# Patient Record
Sex: FEMALE | Race: WHITE | NOT HISPANIC OR LATINO | Employment: FULL TIME | URBAN - METROPOLITAN AREA
[De-identification: names, ages, dates, MRNs, and addresses within clinical notes are randomized per-mention and may not be internally consistent; named-entity substitution may affect disease eponyms.]

---

## 2018-02-14 ENCOUNTER — GYNECOLOGY VISIT (OUTPATIENT)
Dept: OBGYN | Facility: CLINIC | Age: 23
End: 2018-02-14
Payer: OTHER GOVERNMENT

## 2018-02-14 ENCOUNTER — HOSPITAL ENCOUNTER (OUTPATIENT)
Facility: MEDICAL CENTER | Age: 23
End: 2018-02-14
Attending: OBSTETRICS & GYNECOLOGY
Payer: OTHER GOVERNMENT

## 2018-02-14 VITALS
HEIGHT: 68 IN | BODY MASS INDEX: 29.1 KG/M2 | WEIGHT: 192 LBS | DIASTOLIC BLOOD PRESSURE: 70 MMHG | SYSTOLIC BLOOD PRESSURE: 115 MMHG

## 2018-02-14 DIAGNOSIS — Z11.3 SCREEN FOR SEXUALLY TRANSMITTED DISEASES: ICD-10-CM

## 2018-02-14 DIAGNOSIS — Z30.017 ENCOUNTER FOR INITIAL PRESCRIPTION OF IMPLANTABLE SUBDERMAL CONTRACEPTIVE: ICD-10-CM

## 2018-02-14 DIAGNOSIS — Z12.4 PAP SMEAR FOR CERVICAL CANCER SCREENING: ICD-10-CM

## 2018-02-14 DIAGNOSIS — Z01.419 WELL WOMAN EXAM WITH ROUTINE GYNECOLOGICAL EXAM: ICD-10-CM

## 2018-02-14 PROCEDURE — 99395 PREV VISIT EST AGE 18-39: CPT | Performed by: OBSTETRICS & GYNECOLOGY

## 2018-02-14 PROCEDURE — 87491 CHLMYD TRACH DNA AMP PROBE: CPT

## 2018-02-14 PROCEDURE — 88175 CYTOPATH C/V AUTO FLUID REDO: CPT

## 2018-02-14 PROCEDURE — 87591 N.GONORRHOEAE DNA AMP PROB: CPT

## 2018-02-14 ASSESSMENT — ENCOUNTER SYMPTOMS
ABDOMINAL PAIN: 0
HEARTBURN: 0
FEVER: 0
MYALGIAS: 0
COUGH: 0
DEPRESSION: 0
NAUSEA: 0
DOUBLE VISION: 0
CHILLS: 0
VOMITING: 0
BLURRED VISION: 0

## 2018-02-15 LAB
C TRACH DNA GENITAL QL NAA+PROBE: NEGATIVE
CYTOLOGY REG CYTOL: NORMAL
N GONORRHOEA DNA GENITAL QL NAA+PROBE: NEGATIVE
SPECIMEN SOURCE: NORMAL

## 2018-07-02 ENCOUNTER — TELEPHONE (OUTPATIENT)
Dept: OBGYN | Facility: CLINIC | Age: 23
End: 2018-07-02

## 2018-07-02 NOTE — TELEPHONE ENCOUNTER
LM for patient to return call. We have tried to reach patient several times, including sending her a letter. Patient needs to contact pharmacy before they will send device. Pharmacy number in referral

## 2018-07-02 NOTE — TELEPHONE ENCOUNTER
----- Message from Karen Cox sent at 7/2/2018 11:32 AM PDT -----  Regarding: questions  Contact: [phone]  Patient called states she is wanting to get update on her iud thank you.

## 2018-08-03 ENCOUNTER — OFFICE VISIT (OUTPATIENT)
Dept: URGENT CARE | Facility: PHYSICIAN GROUP | Age: 23
End: 2018-08-03
Payer: OTHER GOVERNMENT

## 2018-08-03 VITALS
WEIGHT: 186 LBS | BODY MASS INDEX: 28.19 KG/M2 | HEIGHT: 68 IN | RESPIRATION RATE: 16 BRPM | SYSTOLIC BLOOD PRESSURE: 116 MMHG | OXYGEN SATURATION: 97 % | TEMPERATURE: 99.1 F | DIASTOLIC BLOOD PRESSURE: 78 MMHG | HEART RATE: 62 BPM

## 2018-08-03 DIAGNOSIS — H92.01 OTALGIA, RIGHT: ICD-10-CM

## 2018-08-03 DIAGNOSIS — H69.91 ETD (EUSTACHIAN TUBE DYSFUNCTION), RIGHT: ICD-10-CM

## 2018-08-03 PROCEDURE — 99214 OFFICE O/P EST MOD 30 MIN: CPT | Performed by: PHYSICIAN ASSISTANT

## 2018-08-04 NOTE — PROGRESS NOTES
Chief Complaint   Patient presents with   • Otalgia     Right side.  Onset 12 am today.       HISTORY OF PRESENT ILLNESS: Patient is a 23 y.o. female who presents today for about 24 hour history of right ear pain and pressure.  The pain is sharp and is waxing and waning.  No vertigo.  No headaches.  No fevers, chills.    No drainage from the ear.  She denies sensation of hearing loss from the right.   She denies recent travel or elevation changes.   She notes she did have congestion/sinus pressure last week but that has all improved including the sinus pressure.   No attempted interventions.     Patient Active Problem List    Diagnosis Date Noted   • Seasonal allergies 2015   • Atopic dermatitis 2015   • Foot pain, bilateral 2015   • ADD (attention deficit disorder) 2015       Allergies:Patient has no known allergies.    No current [facility]-ordered outpatient prescriptions on file.     No current [facility]-ordered facility-administered medications on file.        Past Medical History:   Diagnosis Date   • ADD (attention deficit disorder)    • Anxiety    • Depression        Social History   Substance Use Topics   • Smoking status: Never Smoker   • Smokeless tobacco: Never Used   • Alcohol use 0.5 oz/week     1 Glasses of wine per week       Family Status   Relation Status   • Mo Alive   • Sis Alive   • MGMo    • MGFa    • PGMo Alive   • PGFa Alive   • Fa Alive   • Neg Hx (Not Specified)     Family History   Problem Relation Age of Onset   • Hypertension Mother    • Psychiatry Sister    • Psychiatry Maternal Grandmother    • Heart Attack Maternal Grandfather    • Cancer Paternal Grandmother 70        breast   • Cancer Paternal Grandfather         skin   • Heart Attack Paternal Grandfather    • Stroke Paternal Grandfather    • Hyperlipidemia Neg Hx    • Diabetes Neg Hx    • Heart Disease Neg Hx        ROS:  Review of Systems   Constitutional: Negative for fever, chills, weight loss and  "malaise/fatigue.   HENT: SEE HPI  Eyes: Negative for blurred vision.   Respiratory: Negative for cough, sputum production, shortness of breath and wheezing.    Cardiovascular: Negative for chest pain, palpitations, orthopnea and leg swelling.   Gastrointestinal: Negative for heartburn, nausea, vomiting and abdominal pain.   Genitourinary: Negative for dysuria, urgency and frequency.     Exam:  Blood pressure 116/78, pulse 62, temperature 37.3 °C (99.1 °F), resp. rate 16, height 1.715 m (5' 7.5\"), weight 84.4 kg (186 lb), last menstrual period 07/25/2018, SpO2 97 %.  General:  Well nourished, well developed female in NAD  Eyes: PERRLA, EOM within normal limits, no conjunctival injection, no scleral icterus, visual fields and acuity grossly intact.  Ears: Normal shape and symmetry, no tenderness, no discharge. External canals are without any significant edema or erythema. Tympanic membranes are without any inflammation, no effusion. Gross auditory acuity is intact.  No mastoid tenderness, no periauricular lymphadenopathy or tenderness  Nose: Symmetrical, sinuses without tenderness, scant clear rhinorrhea.   Mouth: reasonable hygiene, no erythema exudates or tonsillar enlargement.  Neck: no masses, range of motion within normal limits, no tracheal deviation. No lymphadenopathy  Pulmonary: Normal respiratory effort, no wheezes, crackles, or rhonchi.  Cardiovascular: regular rate and rhythm without murmurs, rubs, or gallops.  Skin: No visible rashes or lesion. Warm, pink, dry.   Extremities: no clubbing, cyanosis, or edema.  Neuro: A&O x 3. Speech normal/clear.  Normal gait.         Assessment/Plan:  1. ETD (Eustachian tube dysfunction), right     2. Otalgia, right         -benign ear exam as well as remainder of HENT for other referred sources of otalgia.  Consistent with ETD.   -discussed trial of saline irrigation and/or daily use of Flonase for inflammation as well as 2-3 days of morning decongestant trial.   -signs " and symptoms of bacterial ear infection and RTC precautions discussed.   -NSAID/Tylenol for pain ok.       Supportive care, differential diagnoses, and indications for immediate follow-up discussed with patient.   Pathogenesis of diagnosis discussed including typical length and natural progression.   Instructed to return to clinic or nearest emergency department for any change in condition, further concerns, or worsening of symptoms.  Patient states understanding of the plan of care and discharge instructions.        Sachi Lawton P.A.-C.

## 2018-08-30 ENCOUNTER — TELEPHONE (OUTPATIENT)
Dept: OBGYN | Facility: CLINIC | Age: 23
End: 2018-08-30

## 2018-11-15 ENCOUNTER — GYNECOLOGY VISIT (OUTPATIENT)
Dept: OBGYN | Facility: CLINIC | Age: 23
End: 2018-11-15
Payer: OTHER GOVERNMENT

## 2018-11-15 VITALS — DIASTOLIC BLOOD PRESSURE: 70 MMHG | SYSTOLIC BLOOD PRESSURE: 112 MMHG | WEIGHT: 173 LBS | BODY MASS INDEX: 26.7 KG/M2

## 2018-11-15 DIAGNOSIS — Z30.017 ENCOUNTER FOR INSERTION SUBDERMAL CONTRACEPTIVE: ICD-10-CM

## 2018-11-15 DIAGNOSIS — Z30.46 ENCOUNTER FOR REMOVAL OF SUBDERMAL CONTRACEPTIVE IMPLANT: ICD-10-CM

## 2018-11-15 PROCEDURE — 11983 REMOVE/INSERT DRUG IMPLANT: CPT | Performed by: OBSTETRICS & GYNECOLOGY

## 2018-11-15 NOTE — PROGRESS NOTES
Nexplanon Removal :  Patient given informed consent, and is aware that removal may take longer, require more anesthesia and time.  It also can make a scar slightly, but not substantially larger, than that used for insertion .   Patient is aware that we recommend alternative contraception, until she achieves a normal cycle after removal. Patient is aware that removal, requires identification of the entire implant, either by visual inspection or by xray imagery.     After prepped and draping of left upper inner forearm, implant visually identified and site of insertion marked.   Lidocaine 1% with epi infiltrated both distal and deep to the prior insertion site for anesthesia.   Utilizing a scalpel # 15, a small vertical incision was made near scar of prior insertion site in the site of maximal implant visualization.   Implant identified and capsule was incised with scalpel.  Implant grasped with mosquito forceps and was easily removed, examined and found to be intact/complete.                                    Procedure Note : Nexplanon Insertion :     Today I discussed with the patient subdermal implant, Nexplanon.  We discussed its mechanism of action and how it prevents pregnancy  Discussed that the implant is a progesterone only form of contraception.  Also discussed with patient risks associated with placement of the device which can include infection bruising and pain. We also discussed the possibility of the device can be displaced. We discussed that the device would be palpable under the skin of the arm.  I also discussed with the patient side effects of the device which can include but are not limited to, irregular bleeding which can include amenorrhea, irregular spotting and also worsening of menstrual cycles. There is an increased risk of headaches related to the device and potential for weight gain.  After thorough discussion the patient had opportunity to ask questions  regarding the device and at this time desires the device to be placed today.  Patient information handout has been given for the device.    After informed consent and discussion of risks and benefits, patient was previously prepped and draped to expose the inner medial surface of the upper left arm. Area of prior Nexplanon site was used for new Nexplanon device.  Area totally anesthestized prior for removal of old Nexplanon.   Nexplanon applicator placed with bevel of needle down and skin lifted and inserted to hub. Trigger fired and nexplanon released.  Implant palpated by myself and patient in proper location.   Small needle puncture hemostatic     Hemostasis at removal site achieved with pressure.  Incision closed with Steri Strips. The forearm was then wrapped with a pressure dressing.   Patient tolerated the procedure well.   Remove outer adhesive roll gauze after 24 hrs and steri strips after 3-4 days.   Patient given Postoperative Advice  Take NSAIDS and tylenol for pain, ice packs prn bruising/discomfort  RTC as needed

## 2019-06-25 ENCOUNTER — HOSPITAL ENCOUNTER (OUTPATIENT)
Facility: MEDICAL CENTER | Age: 24
End: 2019-06-25
Attending: ADVANCED PRACTICE MIDWIFE
Payer: COMMERCIAL

## 2019-06-25 ENCOUNTER — HOSPITAL ENCOUNTER (OUTPATIENT)
Facility: MEDICAL CENTER | Age: 24
End: 2019-06-25
Attending: OBSTETRICS & GYNECOLOGY
Payer: COMMERCIAL

## 2019-06-25 ENCOUNTER — GYNECOLOGY VISIT (OUTPATIENT)
Dept: OBGYN | Facility: CLINIC | Age: 24
End: 2019-06-25
Payer: COMMERCIAL

## 2019-06-25 VITALS — SYSTOLIC BLOOD PRESSURE: 110 MMHG | WEIGHT: 167 LBS | BODY MASS INDEX: 25.77 KG/M2 | DIASTOLIC BLOOD PRESSURE: 64 MMHG

## 2019-06-25 DIAGNOSIS — N89.8 VAGINAL DISCHARGE: ICD-10-CM

## 2019-06-25 DIAGNOSIS — N89.8 VAGINAL ITCHING: ICD-10-CM

## 2019-06-25 PROCEDURE — 87660 TRICHOMONAS VAGIN DIR PROBE: CPT

## 2019-06-25 PROCEDURE — 99213 OFFICE O/P EST LOW 20 MIN: CPT | Performed by: OBSTETRICS & GYNECOLOGY

## 2019-06-25 PROCEDURE — 87510 GARDNER VAG DNA DIR PROBE: CPT

## 2019-06-25 PROCEDURE — 87480 CANDIDA DNA DIR PROBE: CPT

## 2019-06-25 NOTE — PROGRESS NOTES
GYN Visit  CC: vaginal itching     Kathie Urban is a 24 y.o.  who presents today for vaginal itching.  She reports that she was treated with multiple courses of antibiotics about a month ago for sinus infection.  Since then she has experienced vaginal itching.  Itching is much worse after intercourse.  Denies noticing any abnormal vaginal discharge.   Has not tried any thing over-the-counter to treat this nor has she noticed any other relieving factors.  Does not use lubrication during intercourse.  Denies any abnormal vaginal bleeding, dysuria, urinary symptoms or bowel symptoms.     OB History:    OB History    Para Term  AB Living   2 0     2     SAB TAB Ectopic Molar Multiple Live Births     2              # Outcome Date GA Lbr Carlitos/2nd Weight Sex Delivery Anes PTL Lv   2 TAB 2015           1 TAB                   Review of Systems:   Pertinent positives documented in HPI and all other systems reviewed & are negative.    All PMH, PSH, allergies, social history and FH reviewed and updated today:  Past Medical History:  Past Medical History:   Diagnosis Date   • ADD (attention deficit disorder)    • Anxiety    • Depression        Past Surgical History:  Past Surgical History:   Procedure Laterality Date   • APPENDECTOMY         Medications:   No current [facility]-ordered outpatient prescriptions on file.     No current [facility]-ordered facility-administered medications on file.        Allergies: Patient has no known allergies.    Social History:  Social History     Social History   • Marital status: Single     Spouse name: N/A   • Number of children: N/A   • Years of education: N/A     Occupational History   • full time student      [employer]     Social History Main Topics   • Smoking status: Never Smoker   • Smokeless tobacco: Never Used   • Alcohol use 0.5 oz/week     1 Glasses of wine per week   • Drug use: No   • Sexual activity: Yes     Partners: Male     Birth control/ protection: Implant      Other Topics Concern   • Not on file     Social History Narrative   • No narrative on file       Family History:  Family History   Problem Relation Age of Onset   • Hypertension Mother    • Psychiatry Sister    • Psychiatry Maternal Grandmother    • Heart Attack Maternal Grandfather    • Cancer Paternal Grandmother 70        breast   • Cancer Paternal Grandfather         skin   • Heart Attack Paternal Grandfather    • Stroke Paternal Grandfather    • Hyperlipidemia Neg Hx    • Diabetes Neg Hx    • Heart Disease Neg Hx            Objective:   Vitals:  /64   Wt 75.8 kg (167 lb)   Body mass index is 25.77 kg/m². (Goal BM I>18 <25)    Physical Exam:   Nursing note and vitals reviewed.  GENERAL: No acute distress  HENT: Atraumatic, normocephalic  EYES: Extraocular movements intact, pupils equal and reactive to light  NECK: Supple, Full ROM  CHEST: No deformities, Equal chest expansion  RESP: Unlabored, no stridor or audible wheeze  ABD: Soft, Nontender, Non-Distended  Extremities: No Clubbing, Cyanosis, or Edema  Skin: Warm/dry, without rases  Neuro: A/O x 4, CN 2-12 Grossly intact, Motor/sensory grossly intact  Psych: Normal mood, behavior, and affect    Genitourinary:  Normal appearing external female genitalia without any rashes, lesions, labial fusion or tenderness.  Vagina is pink moist and well rugated.  White thick discharge present within vaginal vault.  Cervix well visualized without masses or lesions.        Assessment/Plan:   Kathie Urban is a 24 y.o.  female who presents for:    1. Vaginal discharge  Pathology Specimen   2. Vaginal itching  Pathology Specimen     #Vaginal discharge/itching.  Suspect vulvovaginal candidiasis versus bacterial vaginosis.  Vaginal DNA swab obtained today.  Will treat once results obtained.  Will contact patient through my chart with results and send appropriate treatment to pharmacy at that time.  If results are negative discussed with patient that she may  need more lubrication during intercourse.  Discussed diagnoses of yeast infection and bacterial vaginosis with patient and her questions were answered.  #Follow up.  RTC annually or sooner if concerns or questions arise.    Patient was seen for 15 minutes of which > 50% of appointment time was spent on face-to-face counseling and coordination of care regarding the above.

## 2019-06-27 ENCOUNTER — TELEPHONE (OUTPATIENT)
Dept: OBGYN | Facility: CLINIC | Age: 24
End: 2019-06-27

## 2019-06-27 DIAGNOSIS — N76.0 ACUTE VAGINITIS: ICD-10-CM

## 2019-06-27 NOTE — TELEPHONE ENCOUNTER
"Mike from [facility] called stating they need a vaginal pathogens order, they are unable to use \"pathology specimen\". Spoke with midwife Cipriano Armstrong and she will submit new order   "

## 2019-06-28 DIAGNOSIS — N76.0 ACUTE VAGINITIS: ICD-10-CM

## 2019-06-28 LAB
CANDIDA DNA VAG QL PROBE+SIG AMP: NEGATIVE
G VAGINALIS DNA VAG QL PROBE+SIG AMP: POSITIVE
T VAGINALIS DNA VAG QL PROBE+SIG AMP: NEGATIVE

## 2019-06-28 RX ORDER — METRONIDAZOLE 500 MG/1
500 TABLET ORAL 2 TIMES DAILY
Qty: 14 TAB | Refills: 0 | Status: SHIPPED | OUTPATIENT
Start: 2019-06-28 | End: 2019-09-04

## 2019-07-25 ENCOUNTER — HOSPITAL ENCOUNTER (OUTPATIENT)
Dept: LAB | Facility: MEDICAL CENTER | Age: 24
End: 2019-07-25
Attending: ADVANCED PRACTICE MIDWIFE
Payer: COMMERCIAL

## 2019-07-25 ENCOUNTER — GYNECOLOGY VISIT (OUTPATIENT)
Dept: OBGYN | Facility: CLINIC | Age: 24
End: 2019-07-25
Payer: COMMERCIAL

## 2019-07-25 VITALS
SYSTOLIC BLOOD PRESSURE: 106 MMHG | DIASTOLIC BLOOD PRESSURE: 66 MMHG | HEIGHT: 67 IN | WEIGHT: 164 LBS | BODY MASS INDEX: 25.74 KG/M2

## 2019-07-25 DIAGNOSIS — N76.0 ACUTE VAGINITIS: ICD-10-CM

## 2019-07-25 PROCEDURE — 87086 URINE CULTURE/COLONY COUNT: CPT

## 2019-07-25 PROCEDURE — 99213 OFFICE O/P EST LOW 20 MIN: CPT | Performed by: ADVANCED PRACTICE MIDWIFE

## 2019-07-25 RX ORDER — FLUCONAZOLE 150 MG/1
TABLET ORAL
Qty: 2 TAB | Refills: 1 | Status: SHIPPED | OUTPATIENT
Start: 2019-07-25 | End: 2019-09-04

## 2019-07-25 NOTE — NON-PROVIDER
Patient here states she has some vaginal discomfort.  States she was taking Flagyl and on the second to last day her symptoms worsen.  Phone Number [phone]  Pharmacy Confirmation

## 2019-07-25 NOTE — PROGRESS NOTES
"Kathie Urban is a 24 y.o. female who presents for continue vaginal irritation.        HPI Comments: Pt presents for vaginal irritations.  Patient's last menstrual period was 07/22/2019. Patient reports that after her last visit, she did use the flagyl as directed. She reports that her symptoms did resolve but on day 6 of the medication, she noticed change recurrence of symnptoms. At that time, she decided to start boric acid suppositories. She completed these with relief. She had intercourse two days afterwards and began to notice symptoms again. However, she reports starting her period the next day. Today she is without significant irritation or swelling. She has one male sexual partner and has not changes partners. She does report that after intercourse she feels \"raw\". She reports that she does feel well lubricated during intercourse. She does not use lubricants or condoms. Last gonorrhea and chlamydia testing negative in 2017. Patient did use probiotics but discontinued approximately one month ago. No change in soap or detergent. She uses cetaphil as her soap and does not wash aggressively in her vaginal area.     Review of Systems   Pertinent positives documented in HPI and all other systems reviewed & are negative      Past Medical History:   Diagnosis Date   • ADD (attention deficit disorder)    • Anxiety    • Depression        No medications at current     Objective:   Vital measurements:  /66   Ht 1.702 m (5' 7\")   Wt 74.4 kg (164 lb)   Body mass index is 25.69 kg/m². (Goal BM I>18 <25)    Physical Exam   Nursing note and vitals reviewed.  Constitutional: She is oriented to person, place, and time. She appears well-developed and well-nourished. No distress.     Abdominal: Soft. Bowel sounds are normal. She exhibits no distension and no mass. No tenderness. She has no rebound and no guarding.     Genitourinary:  Pelvic exam was performed with patient supine.  External genitalia with no abnormal " pigmentation, labial fusion,rash, tenderness, lesion or injury to the labia bilaterally.  Vagina is moist with no lesions, foul discharge, erythema, or tenderness. Bleeding is present consistent with menses. No foreign body around the vagina or signs of injury.   Cervix exhibits no motion tenderness. Dark blood was cleared and cervix is without friability.    Musculoskeletal: Normal range of motion. She exhibits no edema and no tenderness.     Lymphadenopathy: She has no cervical adenopathy.     Skin: Skin is warm and dry. No rash noted. She is not diaphoretic. No erythema. No pallor.     Psychiatric: She has a normal mood and affect. Her behavior is normal. Judgment and thought content normal.               Assessment:     1. Acute vaginitis  URINE CULTURE    CHLAMYDIA/GC PCR URINE OR SWAB       Plan:   1. Patient and I discussed that at current, there are no abnormal findings. However, due to patient continued complaints, will rule out UTI and repeat GC/Chlamydia testing by urine. At this time, I am concerned about dryness to her vulva as she does have history of sensitive skin. I have requested that after her shower, she place a small barrier of vaseline to this area with clean hands. Also, will see if diflucan x 1 will resolve symptoms although presentation is not consistent with yeast. Unable today to collect vaginal pathogens related to bleeding.  Will call with results.

## 2019-07-26 ENCOUNTER — PATIENT MESSAGE (OUTPATIENT)
Dept: OBGYN | Facility: CLINIC | Age: 24
End: 2019-07-26

## 2019-07-27 LAB
BACTERIA UR CULT: NORMAL
SIGNIFICANT IND 70042: NORMAL
SITE SITE: NORMAL
SOURCE SOURCE: NORMAL

## 2019-09-04 ENCOUNTER — GYNECOLOGY VISIT (OUTPATIENT)
Dept: OBGYN | Facility: CLINIC | Age: 24
End: 2019-09-04
Payer: COMMERCIAL

## 2019-09-04 VITALS — DIASTOLIC BLOOD PRESSURE: 80 MMHG | BODY MASS INDEX: 26 KG/M2 | WEIGHT: 166 LBS | SYSTOLIC BLOOD PRESSURE: 124 MMHG

## 2019-09-04 DIAGNOSIS — Z01.419 WELL WOMAN EXAM: ICD-10-CM

## 2019-09-04 PROCEDURE — 99395 PREV VISIT EST AGE 18-39: CPT | Performed by: OBSTETRICS & GYNECOLOGY

## 2019-09-04 NOTE — NON-PROVIDER
Pt here for Annual exam  Pt states has questions regarding her Nexplanon  Good#[phone]  Pharmacy verified

## 2019-09-04 NOTE — PROGRESS NOTES
Well woman visit     Kathie Urban is a 24 y.o.  who presents for annual exam.  Has been having intermittent bleeding with Nexplanon in place (new from 2018).  This doesn't bother her very much at this point.  Denies any other complaints today.  No abnormal vaginal discharge, dyspareunia, breast concerns, urinary or bowel complaints.  No concerns about STDs as she is in a monogamous relationship.    GYN History:  Patient's last menstrual period was 2019 (exact date).. Menarche @10.  Has Nexplanon - menses irreg.  Last pap 2018,  no h/o abnormal pap.  No history of cone biopsy, LEEP or any other cervical, uterine or gynecologic surgery. No history of sexually transmitted diseases.  Currently sexually active with one male partner.  Utilizing Nexplanon for contraception and has used Nexplanon, OCPs, and condoms in the past.     OB History:    OB History    Para Term  AB Living   2 0     2     SAB TAB Ectopic Molar Multiple Live Births     2              # Outcome Date GA Lbr Carlitos/2nd Weight Sex Delivery Anes PTL Lv   2 TAB 2015           1 TAB                Health Maintenance:  Immunizations: up to date    Review of Systems:   Pertinent positives documented in HPI and all other systems reviewed & are negative.    All PMH, PSH, allergies, social history and FH reviewed and updated today:  Past Medical History:  Past Medical History:   Diagnosis Date   • ADD (attention deficit disorder)    • Anxiety    • Depression      Past Surgical History:  Past Surgical History:   Procedure Laterality Date   • APPENDECTOMY       Medications:   No current [facility]-ordered outpatient medications on file.     No current [facility]-ordered facility-administered medications on file.      Allergies: Patient has no known allergies.    Social History:  Social History     Socioeconomic History   • Marital status: Single     Spouse name: Not on file   • Number of children: Not on file   • Years of education:  Not on file   • Highest education level: Not on file   Occupational History   • Occupation: full time student     Comment: [employer]   Social Needs   • Financial resource strain: Not on file   • Food insecurity:     Worry: Not on file     Inability: Not on file   • Transportation needs:     Medical: Not on file     Non-medical: Not on file   Tobacco Use   • Smoking status: Never Smoker   • Smokeless tobacco: Never Used   Substance and Sexual Activity   • Alcohol use: Yes     Alcohol/week: 0.5 oz     Types: 1 Glasses of wine per week   • Drug use: No   • Sexual activity: Yes     Partners: Male     Birth control/protection: Implant   Lifestyle   • Physical activity:     Days per week: Not on file     Minutes per session: Not on file   • Stress: Not on file   Relationships   • Social connections:     Talks on phone: Not on file     Gets together: Not on file     Attends Congregation service: Not on file     Active member of club or organization: Not on file     Attends meetings of clubs or organizations: Not on file     Relationship status: Not on file   • Intimate partner violence:     Fear of current or ex partner: Not on file     Emotionally abused: Not on file     Physically abused: Not on file     Forced sexual activity: Not on file   Other Topics Concern   • Not on file   Social History Narrative   • Not on file       Family History:  Family History   Problem Relation Age of Onset   • Hypertension Mother    • Psychiatric Illness Sister    • Psychiatric Illness Maternal Grandmother    • Heart Attack Maternal Grandfather    • Cancer Paternal Grandmother 70        breast   • Cancer Paternal Grandfather         skin   • Heart Attack Paternal Grandfather    • Stroke Paternal Grandfather    • Hyperlipidemia Neg Hx    • Diabetes Neg Hx    • Heart Disease Neg Hx            Objective:   Vitals:  /80   Wt 75.3 kg (166 lb)   Body mass index is 26 kg/m². (Goal BM I>18 <25)    Physical Exam:   Nursing note and vitals  reviewed.  Physical Exam   Constitutional: She is oriented to person, place, and time and well-developed, well-nourished, and in no distress.   HENT:   Head: Normocephalic and atraumatic.   Eyes: Pupils are equal, round, and reactive to light.   Neck: Normal range of motion. Neck supple. No thyromegaly present.   Cardiovascular: Normal rate.   Pulmonary/Chest: Effort normal. Right breast exhibits no inverted nipple, no mass, no nipple discharge, no skin change and no tenderness. Left breast exhibits no inverted nipple, no mass, no nipple discharge, no skin change and no tenderness.   Abdominal: Soft. Bowel sounds are normal.   Musculoskeletal: Normal range of motion.   Neurological: She is alert and oriented to person, place, and time.   Skin: Skin is warm and dry.   Psychiatric: Mood, memory, affect and judgment normal.     Genitourinary: deferred     Assessment/Plan:     No diagnosis found.    Kathie Urban is a 24 y.o.  female who presents for  her annual gynecologic exam.   # Preventative health care:   --Breast self awareness discussed. Mammogram not yet indicated (annually starting at age 40).  --Cervical cancer screening.  Up-to-date on Paps  --Immunizations are  up to date.  --Diet, exercise, vitamin supplementation, and hydration discussed.  # Contraception: Utilizing Nexplanon.  We discussed mechanism of action of Nexplanon and options for controlling spotting.  Patient reports understanding after discussion and does not want any further intervention for the spotting at this point as it is not extremely bothersome to her right now.  # STD screening: Declines-monogamous relationship    # Follow up annually or sooner if concerns or questions arise.

## 2020-01-01 NOTE — PROGRESS NOTES
"Subjective:      Kathie Urban is a 22 y.o. female who presents with Annual Exam (annual exam)            22-year-old  here today for annual gynecologic exam and discussion of birth control options. Patient is using a subdermal implant at this time she states she thinks it may have caused her to gain weight. But otherwise she is generally satisfied with the device. She does state she has some abnormal bleeding which is consistent with device use. Patient is otherwise without complaints no pelvic pain or pressure no abnormal vaginal discharge.        Review of Systems   Constitutional: Negative for chills and fever.   HENT: Negative for hearing loss.    Eyes: Negative for blurred vision and double vision.   Respiratory: Negative for cough.    Cardiovascular: Negative for chest pain.   Gastrointestinal: Negative for abdominal pain, heartburn, nausea and vomiting.   Genitourinary: Negative for dysuria, frequency and urgency.   Musculoskeletal: Negative for myalgias.   Skin: Negative for rash.   Psychiatric/Behavioral: Negative for depression.          Objective:     /70   Ht 1.715 m (5' 7.5\")   Wt 87.1 kg (192 lb)   LMP 2018   Breastfeeding? No   BMI 29.63 kg/m²      Physical Exam   Constitutional: She is oriented to person, place, and time. She appears well-developed and well-nourished.   HENT:   Head: Normocephalic and atraumatic.   Neck: Normal range of motion. Neck supple.   Cardiovascular: Normal rate.    Pulmonary/Chest: Effort normal and breath sounds normal. Right breast exhibits no inverted nipple, no mass, no nipple discharge, no skin change and no tenderness. Left breast exhibits no inverted nipple, no mass, no nipple discharge, no skin change and no tenderness. Breasts are symmetrical. There is no breast swelling.   Abdominal: Soft. Bowel sounds are normal. She exhibits no distension.   Genitourinary: Rectal exam shows no external hemorrhoid. No breast tenderness, discharge or " Baby is a 39.2 wk GA M born to a 30 y/o  mother via repeat C/S. Maternal history uncomplicated. Prenatal history uncomplicated. Maternal B+. PNL neg, NR, and immune. GBS unknown. No rupture, no labor. Baby born vigorous and crying spontaneously. WDSS. Apgars 8/9. Mom plans to breastfeed. Yes to hepB. Yes to circ. COVID status neg. Baby found to have a sacral dimple. bleeding. Pelvic exam was performed with patient supine. No labial fusion. There is no rash, tenderness, lesion or injury on the right labia. There is no rash, tenderness, lesion or injury on the left labia. Uterus is not deviated, not enlarged, not fixed and not tender. Cervix exhibits no motion tenderness, no discharge and no friability. Right adnexum displays no mass, no tenderness and no fullness. Left adnexum displays no mass, no tenderness and no fullness. No erythema, tenderness or bleeding in the vagina. No foreign body in the vagina. No signs of injury around the vagina. No vaginal discharge found.   Musculoskeletal: Normal range of motion.   Lymphadenopathy:        Right: No inguinal adenopathy present.        Left: No inguinal adenopathy present.   Neurological: She is alert and oriented to person, place, and time.   Skin: Skin is warm and dry.   Psychiatric: She has a normal mood and affect.   Nursing note and vitals reviewed.              Assessment/Plan:     1. Well woman exam with routine gynecological exam    - THINPREP RFLX HPV ASCUS W/CTNG; Future    2. Screen for sexually transmitted diseases    - THINPREP RFLX HPV ASCUS W/CTNG; Future    3. Pap smear for cervical cancer screening    - THINPREP RFLX HPV ASCUS W/CTNG; Future    4. Encounter for initial prescription of implantable subdermal contraceptive    - REFERRAL TO GYNECOLOGY

## 2020-06-30 ENCOUNTER — OFFICE VISIT (OUTPATIENT)
Dept: MEDICAL GROUP | Facility: PHYSICIAN GROUP | Age: 25
End: 2020-06-30
Payer: COMMERCIAL

## 2020-06-30 ENCOUNTER — TELEPHONE (OUTPATIENT)
Dept: SCHEDULING | Facility: IMAGING CENTER | Age: 25
End: 2020-06-30

## 2020-06-30 VITALS
HEART RATE: 72 BPM | BODY MASS INDEX: 26.06 KG/M2 | TEMPERATURE: 98.2 F | WEIGHT: 166 LBS | HEIGHT: 67 IN | SYSTOLIC BLOOD PRESSURE: 104 MMHG | DIASTOLIC BLOOD PRESSURE: 64 MMHG | OXYGEN SATURATION: 93 %

## 2020-06-30 DIAGNOSIS — J30.2 SEASONAL ALLERGIES: ICD-10-CM

## 2020-06-30 DIAGNOSIS — Z13.6 SCREENING FOR CARDIOVASCULAR CONDITION: ICD-10-CM

## 2020-06-30 DIAGNOSIS — Z13.21 ENCOUNTER FOR VITAMIN DEFICIENCY SCREENING: ICD-10-CM

## 2020-06-30 DIAGNOSIS — Z00.00 ROUTINE ADULT HEALTH MAINTENANCE: ICD-10-CM

## 2020-06-30 DIAGNOSIS — Z13.228 SCREENING FOR METABOLIC DISORDER: ICD-10-CM

## 2020-06-30 DIAGNOSIS — J34.2 DEVIATED SEPTUM: ICD-10-CM

## 2020-06-30 DIAGNOSIS — Z97.5 NEXPLANON IN PLACE: ICD-10-CM

## 2020-06-30 PROCEDURE — 99214 OFFICE O/P EST MOD 30 MIN: CPT | Performed by: NURSE PRACTITIONER

## 2020-06-30 ASSESSMENT — PATIENT HEALTH QUESTIONNAIRE - PHQ9: CLINICAL INTERPRETATION OF PHQ2 SCORE: 0

## 2020-07-01 PROBLEM — Z97.5 NEXPLANON IN PLACE: Status: ACTIVE | Noted: 2020-07-01

## 2020-07-01 PROBLEM — J34.2 DEVIATED SEPTUM: Status: ACTIVE | Noted: 2020-07-01

## 2020-07-01 NOTE — PROGRESS NOTES
"Chief Complaint   Patient presents with   • Establish Care   • Referral Needed     ent         Subjective:     Kathie Urban is a 25 y.o. female presenting with a deviated septum, requesting referral for potential surgery.    Deviated septum: Chronic unstable issue.  Patient reports she has been told by numerous providers that she has a deviated septum, most recently an allergy specialist.  Reports they attributed this to her persistent allergies and uncontrolled allergic rhinitis.  Denies snoring, but does report she is a mouth breather more often than not.  Has not had any injury or trauma to the face.  Had research on her insurance and requesting referral for .  Has not been evaluated by ENT.    Allergies: Chronic stable issue.  Patient has seasonal allergies.  Currently not on any medication.  Has previously taken OTC nondrowsy daily antihistamines with mild/moderate control.  Is no longer seeing allergist.    Due for routine labs; up-to-date on vaccinations.  Established with [facility].  Has the Nexplanon implant, placed in November 2018.  Has tolerated this well, denies notable defect to hormones.  Due for removal or exchange in November 2021.      Review of systems:      Denies chest pain, shortness of breath, sore throat, difficulty swallowing, new cough, dizziness, severe headache, altered cognition, changes in bowel or bladder habits, decreased sensation, decreased strength, numbness or tingling, intolerable depression or anxiety, rash or skin concerns, changes in vision, fatigue, myalgias, painful or swollen lymph nodes.     No current outpatient medications on file.    Allergies, past medical history, past surgical history, family history, social history reviewed and updated    Objective:     Vitals: /64 (BP Location: Right arm, Patient Position: Sitting, BP Cuff Size: Adult)   Pulse 72   Temp 36.8 °C (98.2 °F) (Temporal)   Ht 1.702 m (5' 7\")   Wt 75.3 kg (166 lb)   " SpO2 93%   BMI 26.00 kg/m²   General: Alert, cooperative, dressed appropriately for weather / situation  Eyes:Normocephalic.  EOMI, no icterus or pallor.  Conjunctivae clear without erythema / irritation.  ENT:  External ears developed; Bilat TMs visualized; appear pearly without bulging, effusion, or erythema; crisp light reflex, gross visualization of septum shows deviation to left.  Lymph: Neck supple, absent of cervical or supraclavicular lymphadenopathy.  Thyroid palpated, free of masses or goiter.   Heart: Regular rate and rhythm.  S1 and S2 normal.  No murmurs auscultated; no murmurs / bruits heard over bilateral carotids.  Bilateral radial pulses strong and equal.  Bilateral posterior tibial pulses strong and equal.  Respiratory: Normal respiratory effort.  Clear to auscultation bilaterally.  AP ratio 1:2   Abdomen: Non-distended;  Skin: Visible skin intact, dry, without rash.  Musculoskeletal: Gait is normal.  Bilateral  strength strong equal.  Moves extremities freely and equally bilaterally  Neuro:  AAOx3 Visual tracking intact, no nystagmus;   Psych:  Affect/mood is normal, judgement is good, memory is intact, grooming is appropriate.    Assessment/Plan:     Kathie was seen today for establish care and referral needed.    Diagnoses and all orders for this visit:    Deviated septum: Referral placed per patient request.  We did discuss that some insurance companies may require evaluation by ear nose and throat first.  She is going to attend an appointment with  and let me know if she needs an additional referral.  -     REFERRAL TO PLASTIC SURGERY    Seasonal allergies: Currently controlled without medication.  Discussed available OTC antihistamines as well as intranasal antihistamines if needed.  -     CBC WITH DIFFERENTIAL; Future    Routine adult health maintenance  -     CBC WITH DIFFERENTIAL; Future  -     Comp Metabolic Panel; Future  -     Lipid Profile; Future  -     VITAMIN D,25  HYDROXY; Future    Screening for cardiovascular condition  -     CBC WITH DIFFERENTIAL; Future  -     Lipid Profile; Future    Encounter for vitamin deficiency screening  -     CBC WITH DIFFERENTIAL; Future  -     VITAMIN D,25 HYDROXY; Future    Screening for metabolic disorder  -     Comp Metabolic Panel; Future    Overall patient is in good health, routine labs to be done.  I will follow-up with her regarding the results on [facility].  She can follow-up yearly, sooner if needed      Return in about 1 year (around 6/30/2021).    Patient verbalized understanding and agreed to plan of care.  Encouraged to contact me with needs via [employer] or by phone if needed.      I have placed the above orders and discussed them with an approved delegating provider.  The MA is performing the below orders under the direction of Dr Ugalde.    Please note that this dictation was created using voice recognition software. I have made every reasonable attempt to correct obvious errors, but I expect that there are errors of grammar and possibly content that I did not discover before finalizing the note.

## 2021-08-18 ENCOUNTER — OFFICE VISIT (OUTPATIENT)
Dept: OBGYN | Facility: CLINIC | Age: 26
End: 2021-08-18
Payer: COMMERCIAL

## 2021-08-18 DIAGNOSIS — O92.79 LACTATION DISORDER, POSTPARTUM CONDITION: ICD-10-CM

## 2021-08-18 DIAGNOSIS — N64.3 HYPERLACTATION: ICD-10-CM

## 2021-08-18 DIAGNOSIS — Z91.89 AT RISK FOR POSTPARTUM DEPRESSION: ICD-10-CM

## 2021-08-18 PROBLEM — Z97.5 NEXPLANON IN PLACE: Status: RESOLVED | Noted: 2020-07-01 | Resolved: 2021-08-18

## 2021-08-18 PROCEDURE — 99215 OFFICE O/P EST HI 40 MIN: CPT | Performed by: NURSE PRACTITIONER

## 2021-08-18 PROCEDURE — 99417 PROLNG OP E/M EACH 15 MIN: CPT | Performed by: NURSE PRACTITIONER

## 2021-08-18 NOTE — PROGRESS NOTES
Summary: Difficulty with baby choking on breast and bottle, slowed growth, making 42-48oz per day of breastmilk. Since Monday has  3x/day for 15 min each breast then bottle which can take 45minutes. When only bottle and waiting until 3 hours feedings are 30 minutes. Pumps 8x/day, offers food every 2-2.5 hours, 8-10x/day. Pooping with each feeding. Today Baby weight not increasing from Monday. Offered bare breast with no sustaining, nipple shield used and baby removed 0.9oz on right being taken off every 11-15 sucks to prevent choking which does result in transient perioral cyanosis, quick recovery. With sitting up more and frequent removal less choking and removed 0.8oz.  Total after 25 minutes is 1.6oz. Ineffecient and not effective with potential adversity likely to be created. Pumped 180ml. Fed bottle paced feeding well, baby took about 20ml, then asleep, but quite the struggle. Plan Stop breastfeeding for the next 72 hours, bottle only about every 3 hours for 90ml, pacing and snuggled.Reintorduce breast one sided only, 10 minutes sitting up for baby to manage flow.  Referred to OT for bottle assistance. Pump off the first 40ml in each bottle and replace bottle to give baby higher calorie milk. Decrease pumping time to 8-12 minutes to start reducing milk supply and then plan to decrease number of pumps per 24 hours. Follow up Ped 21 and Lactation next week     Subjective:     Kathie Urban is a 26 y.o. female here for lactation care. She is here today with baby and father of baby    Concerns:   Latch on difficulties  and oversupply  Choking on bottle and breast    HPI:   Pertinent  history: c/section  Mother does not have a history of advanced maternal age, GDM, hypertension prior to pregnancy, insulin resistance, multiple gestation, PCOS and thyroid disease. Common condition(s) which may interfere with milk supply.  Other risk factors history   of anxiety and depression    Breast  changes in pregnancy: Yes  History of breast surgeries: No    Leaking in pg    FEEDING HISTORY:    Past breastfeeding history:  First baby   Hospital course: Pumping initiated and small flanges caused damage to nipples  Currently: Difficulty with baby choking on breast and bottle, slowed growth, making 42-48oz per day of breastmilk. Since Monday has  3x/day for 15 min each breast then bottle which can take 45minutes. When only bottle and waiting until 3 hours feedings are 30 minutes. Pumps 8x/day, offers food every 2-2.5 hours, 8-10x/day.    Both breasts: Yes  Bottle feeds: 8-10x/24    Supplement: Expressed breast milk  Quantity: Offers 90, takes 70 most of the time, BF 3x/day  How given/devices:  Bottle    Nipple Shield Use: 24 mm  Recommended by: self    Breast Pumping:   Frequency: 8-10x/day  Type of pump: Platinum  Flange size/type: 28.5mm  NO pain with pumping    Maternal ROS:  Constitutional: No fever, chills. Feeling well  Extremities Swelling: No extremity swelling  Breasts: No soreness of breasts and No soreness of nipples  Psychiatric: Managing ok  Mental Health: No mention of feeling irritable, agitated, angry, overwhelmed, apathy, exhaustion nor having sleep changes outside infant feeds/demands or appetite changes       Objective:     Maternal Physical   General: No acute distress  Breasts: Symetrical , Soft, Plugged Duct - no evidence and Mastitis  - no S/S  Nipples: intact  Psychiatric:  Normal mood and affect. Her behavior is normal. Judgment and thought content normal   Mental Health:  Did NOT exhibit sadness, crying, feeling overwhelmed, agitation or hypervigilance.     Assessment/Plan & Lactation Counseling:     Infant exam on infant chart    Infant Weight History:   8/3/21   9#4oz  8/16/21  8#6.0oz Ped office  8/18/2021 8#5.9oz    Infant intake at Breast:: 0.9oz right     Left 0.8oz    Total: 1.7oz  Milk Transfer at this feeding:   Ineffective breastfeeding; not able to transfer a full  feed from breast r/t incoordination of SSB  Pumped: Type of Pump: Platinum    Quantity Pumped:     Total: 180ml  Initiation of Feeding: Infant initiates  Position of Feeding:    Right: football  Left: football  Attachment Achieved: with difficulty  Nipple shield:     Size: 24mm       Introduced by mom  Latch achieved yes  Suck Pattern at the breast: Chewing and Gulping, choking, lips turn cyanotic  Suck Pattern on the bottle: Gulping, Losing milk at the side and Disorganized  Behavior Following Observed Feeding: sleeping, exhausted  Nipple Pain: None     Latch: Mom latches independently, Latch difficulty without nipple shield and Latch difficulty: oral/motor issues  Suckling/Feeding: attaches, audible swallows, baby roots, clicks and elicits DAPHNE, pops off to catch her breath, always sucking as if to keep up, cannot pace herself  Milk Supply Available: oversupply     Maternal Diagnosis/Problem:  At risk for PPD  Lactation disorder, baby not transferring sufficient supply  Hyperlactation       BREASTFEEDING PLAN  Discussed concerns and symptoms as listed above in assessment and guidance summarized below.  Topics reviewed included:    •  The nature of infants oral head/neck structure and function and its impact on latch and transfer of milk.   Discussed tongue immobility creating some of the problem, although frenulum is present there is good lift and extension. Would not refer to ENT at this time     •  Triple feeding and its sustainability and its impact on the mother baby relationship  •  Maternal Mental Health: Having a new baby can be joyful time for some new moms, but a difficult time for others. For all, it is a time of profound physical and emotional change. Balancing baby, family, partners and friends, work, pets, and preexisting or new life stressors as well as sleep deprivation can be extremely challenging. Depression and anxiety may be even more prevalent in those moms who experience challenging  breastfeeding complications or struggle with baby weight gain or other health concerns. Untreated depression and anxiety can contribute to early cessation of breastfeeding, so it is important both for the mental health and physical health of new moms and babies to get on the path to feeling better.  Parents need support, not necessarily education. Our society valorizes breastfeeding so we provide the scaffolding that actually supports it.   •  Sleep or lack of and discussed strategies to manage restorative sleep, although short amounts, significant to the mental health of the mother.   • Https://www.Motivapps.com/watch?v=f6dEnrdRkCp&feature=y primeu.be&ab_channel=Infancy%26[facility]%2[facility]  •  Self Care. Encouraged  support, family and father supportive, get rest, getting out of the house each day, walk to the mailbox or drive somewhere,  a treat at a drive thru.  • Hyperlactation (Oversupply) This is a high rate of milk production often causing breast discomfort and or compelling moms to express beyond what the baby is taking.There is no defined clinical criteria. Infant can present with stopping to suckle/letting go, milk spraying in the baby's face, baby coughing or choking or breast refusal, struggle with initial letdown with gasping, fussiness, rapid weight gain (1# a week), excessive gas and refusing the second breast or breast with larger supply.   o Possible causes:   - Mother induced with pumping, frequent HaaKaa use  - Gilmer phenomenon breasts don't respond to feedback of fullness  - Large storage capacity  o Management  - Decrease pumping frequency and duration  - Follow up weight mandatory while deliberately decreasing supply     • Foremilk Hind milk imbalance this is only problematic with high supply. With high supply high lactose load leading to heavy gut osmotic load causing frequent green, mucous stools, stooling during feeding and bothersome gas.     • Milk supply is dependent on glandular  tissue development, hormonal influences, how many times the baby removes milk and how well the breasts are emptied in a 24 hour period. This is a biological reality that we can NOT work around. If, for any reason, your baby is not latching, or you are not able to nurse, then it is important for you to remove the milk instead by pumping or hand expression.  There's no magic trick, tea, food, drink, cookie or supplement that will increase your milk supply. One  must  effectively remove milk to continue to make and maximize milk. In the early days and weeks that can be 8+ times in 24 hours. For older babies, on average 6-7 + times in 24 hours.    o   •  Feeding:   o Stop breastfeeding for 72 hours to work on better feedings with pumped milk and not exhausting the baby.  o Feed your baby every 1.5-3 hours, more often if baby acts hungry.   o Awaken baby for feeding if going over 3 hours in the day.   o Until back to birth weight, ONE four hour at night is acceptable if has had 8 prior feedings in 24 hours.    o Need to get in 8-12 feedings per 24 hours.     •  Supplement:   • Supplement with expressed milk    •  When bottle-feeding, there are three primary things to consider:    o Nipple Shape:  - Look for a nipple that looks like a “breast at work” not a “breast at rest.”  A “breast at work” has a somewhat cone shape as the nipple and breast tissue is pulled into the baby’s mouth while feeding.  Your baby’s mouth should be able to go around the widest part of the nipple to form a wide-open gape on the bottle like that of a good latch at the breast. In contrast, a breast at rest might look more like, well, a breast: a roundish base with a long skinny nipple.  If the bottle looks like this, your baby’s lips may not be able to get around the widest part of the nipple because it is just too wide resulting in a narrow gape that would hurt on your nipple. This nipple shape may also make it difficult for your baby to make a  complete seal with their lips which leads to air intake and milk spillage.  o Flow Rate of the Nipple:  - The nipple flow should be slow.  Don’t just read the label, but notice how your baby is feeding and trust what you observe.  A study done a few years ago found that “slow flow” varied widely between brands and even between nipples of the same brand.  Try several nipples until you find one that results in a rhythmic sucking pattern but not chugging and gulping.  o Pacing the feeding:  - A slow flow nipple helps, but how you feed the baby is more important.  Good positioning can compensate for a faster flow nipple.  When bottle-feeding, the baby should control how much is consumed at a feeding.  Holding the baby in an upright position with the bottle horizontal ensures that the baby gets milk only when sucking.  Here is a nice video demonstrating this concept of paced bottle feeding,  https://www.Penzata[employer].com/watch?v=AqEEP5oMH0D    •  Pacifier Use:  The [employer] Position Paper reports: Although we recommend a conservative approach regarding pacifier use, we do not endorse a complete ban on the use of pacifiers, nor do we support an approach that induces parental guilt concerning their choices about the use of pacifiers.    •  Nipple shield: Unable to sustain with bare breast, using 24mm Medela apprpriately     • Positioning Techniques for bare breast  o Suggested positions Football but sitting up  o Fine tune position by making sure your fingers beneath the breast as well as your bra, are out of the way of your baby's chin.  o Positioning:  Many positions shown, great sidelying at 7 minutes.   o See http://globalhealthmedia.org/portfolio-items/positions-for-breastfeeding/?empwlyitkVG=56354    •  Latch on Techniques for bare breast. Modify for nipple shield use  o Fine tune latch:  - By holding your baby more securely at the breast, assisting your baby to stay attached by:  - Bringing your  baby to your breast, not breast to the baby  - Your baby's cheek to touch breast securely, nose tipped back  - Hold your baby firmly in place so when your baby forgets to suck and picks it back up again your baby is in the correct spot. You will be extinguishing behavior and replacing it with a deeper latch to stimulate suck and provide satisfaction at the breast  - Your baby needs as much breast as deep in the mouth as possible to allow your nipples to heal and for you more importantly to maximize efficiency at the breast  - Latch is asymmetrical, leading with the chin, getting more underneath.    •  Pumping Guidelines:  ? Both breasts  o Pump 8 times in 24 hours plan to decrease  - Decrease duration  for now  o Type of pump:  - Stebbins and Spectra  - http://www.[employer]/healthcare-professionals/videos/ameda-platinum-with-hygienikit-video  - Spectra Settings  1.  Press letdown button when first starting         § Cycle: 70 / Vacuum: L1 - L 5 (To comfort)  2. Once milk lets down, press letdown button again  § Cycle: 54 / Vacuum: L1 - L12 (To comfort)  - Always double pump  o How long will vary woman to woman, typically 8-15 minutes, or 1-2 minutes after flow slows  o Flange Fit: Freely moving nipple in the tunnel with some movement of the areola.  - Today's evaluation indicates appropriate flange, a little tight but no pain  • Did decrease speed to 40    • Storage (Acceptable guidelines for healthy term babies)  o 10 hours at room temp including your pieces, may rinse but not mandatory  o 8 days refrigerator, don't need  to refrigerate right away if using fresh pumped milk at the next feeding  o Freezer 1 year  o Deep freezer 2 years  o If baby drinks breastmilk from a fresh or refrigerated bottle of breastmilk,  you may return the unused portion to the refrigerator and use once at next feeding.     •  Connect with other mothers:  o Facebook:   - [place] [facility]:  [email or web address]  - Well-Nourished Babies (Private group for questions and support): https://www.facebook.com/groups/775151142530116/  o Breastfeeding [place]   - This is an emotional, informational and safe support [place] with your like-minded community that builds solidarity among moms  - The honest experiences of mothers are highly valued among the other mothers  - Tuesday  at 11am by Zoom,  you will be sent an invitation.   - You may instead copy and paste this link:    [email or web address]    - Passcode  [id number]    In Conclusion:   Family present has verbalized what they can realistically do based on family dynamics, understanding a plan has to be doable to be effective and can be renegotiated at any time.  This is a complex and intimate journey. When obstacles present themselves, it takes confidence, persistence and support. You are now the focus of our Breastfeeding Medicine team; we are here to support your decisions and goals.      Follow up requires close monitoring in this time sensitive window of opportunity to establish milk supply and facilitate the learning of  breastfeeding.    Mom is encouraged to e-mail to update how the plan is working.    Pediatrician appointment: 8/19/21    Follow-up for infant weight check and dyad breastfeeding evaluation in 10 day(s)  Please call [phone] if you have not scheduled your next appointment    A total of 90 minutes, not including infant assessment time, with more than 50% was spent preparing to see the patient, obtaining and reviewing separately obtained history, performing a medically appropriate examination and evaluation, counseling and educating the family,referring for bottle feeding direction and communicating with other health care professionals, documenting clinical information in the electronic health record, independently interpreting weighted feeds and infant  growth results, communicating these results to the family and care coordination as detailed in the above note.       LENNY Healy.

## 2021-08-24 ENCOUNTER — OFFICE VISIT (OUTPATIENT)
Dept: OBGYN | Facility: CLINIC | Age: 26
End: 2021-08-24
Payer: COMMERCIAL

## 2021-08-24 DIAGNOSIS — O92.79 LACTATION DISORDER, POSTPARTUM CONDITION: ICD-10-CM

## 2021-08-24 DIAGNOSIS — N64.3 HYPERLACTATION: ICD-10-CM

## 2021-08-24 PROCEDURE — 99215 OFFICE O/P EST HI 40 MIN: CPT | Performed by: NURSE PRACTITIONER

## 2021-08-24 PROCEDURE — G2212 PROLONG OUTPT/OFFICE VIS: HCPCS | Performed by: NURSE PRACTITIONER

## 2021-08-25 NOTE — PROGRESS NOTES
Summary: Has exclusively pumped, drawing off the first 45ml and using the rest of the milk for feedings. Using an alba and feeding 9-11 times per day, usually 70ml. Minimal spitting up. Still choking on the bottle but less perioral cyanosis, dad gives the bottle slowly. Has found waiting 20 minutes after feeding, changing her then offering the last ounce is working better. Mom is pumping and instead of every 2 hours not doing every 3 hours, still making almost 1600ml per day. Pooping with each feeding. Today Scale to scale  3.5oz gain in 6 days. Discussed breastfeeding intent, willing to try. Independenlty latched with NS and removed 1.2oz, being backed off as gulping intesified and allowed baby to catch her breath, maybe cough, never cyanotic and return to breast. Total intake 2.8oz over 26 minutes. Did stop to change her and offered again for th elast 0.9oz of the 2.8oz. Most successful, one side. Mom pumped an additional 160ml in 8.5 minutes.    Plan May breastfeed 1-2 times per day, mom willing to pump for 5 minutes before feeding to decrease flow and get to hind milk.  Otherwise bottle and dad willing to offer it a little faster seeing how well she did at the breast. Still needs OT strategies and received a letter today so will be reaching out to follow up. Will decrease pumping duration from 15 minutes to 10 and try to stretch pumpings to every 3.5 hours to eventually drop a stimulation. Follow up Ped 21 and Lactation 21 for a weight check only to adjust plan as needed.     Subjective:     Kathie Urban is a 26 y.o. female here for lactation care. She is here today with baby and father of baby Alessandro.     Concerns:   Latch on difficulties  and oversupply  Choking on bottle and breast Evaluation of feeding and weight    HPI:   Pertinent  history: c/section  Mother does not have a history of advanced maternal age, GDM, hypertension prior to pregnancy, insulin resistance, multiple  gestation, PCOS and thyroid disease. Common condition(s) which may interfere with milk supply.  Other risk factors history   of anxiety and depression    Breast changes in pregnancy: Yes  History of breast surgeries: No    Leaking in pg    FEEDING HISTORY:    Past breastfeeding history:  First baby   Hospital course: Pumping initiated and small flanges caused damage to nipples  Prior to consultation on 8/18/21: Difficulty with baby choking on breast and bottle, slowed growth, making 42-48oz per day of breastmilk. Since Monday has  3x/day for 15 min each breast then bottle which can take 45minutes. When only bottle and waiting until 3 hours feedings are 30 minutes. Pumps 8x/day, offers food every 2-2.5 hours, 8-10x/day.  Currently 8/24/2021 Has exclusively pumped, drawing off the first 45ml and using the rest of the milk for feedings. Using an alba and feeding 9-11 times per day, usually 70ml. Minimal spitting up. Still choking on the bottle but less perioral cyanosis, dad gives the bottle slowly. Has found waiting 20 minutes after feeding, changing her then offering the last ounce is working better. Mom is pumping and instead of every 2 hours not doing every 3 hours, still making almost 1600ml per day. Pooping with each feeding    Both breasts: Not breastfeeding the last 6 days  Bottle feeds: 10-11x/24    Supplement: Expressed breast milk  Quantity: Offers 90, takes 70 most of the time  How given/devices:  Bottle    Nipple Shield Use: 24 mm  Recommended by: self    Breast Pumping:   Frequency: 8x/day  Type of pump: Spectrum suction level 3  Flange size/type: 28.5mm  NO pain with pumping    Maternal ROS:  Constitutional: No fever, chills. Feeling well  Extremities Swelling: No extremity swelling  Breasts: No soreness of breasts and No soreness of nipples  Psychiatric: Managing ok  Mental Health: No mention of feeling irritable, agitated, angry, overwhelmed, apathy, exhaustion nor having sleep changes outside  infant feeds/demands or appetite changes       Objective:     Maternal Physical   General: No acute distress  Breasts: Symetrical , Soft, Plugged Duct - no evidence and Mastitis  - no S/S  Nipples: intact  Psychiatric:  Normal mood and affect. Her behavior is normal. Judgment and thought content normal   Mental Health:  Did NOT exhibit sadness, crying, feeling overwhelmed, agitation or hypervigilance.     Assessment/Plan & Lactation Counseling:     Infant exam on infant chart    Infant Weight History:   8/3/21   9#4oz  8/16/21  8#6.0oz Ped office  8/18/2021 8#5.9oz  8/23/21 8#9oz  8/24/2021 8#9.4oz  Scale to scale  3.5oz gain in 6 days.     Infant intake at Breast:: Right: did not offer     Left 1.2oz + 0.70z + 0.9oz    Total: 2.8oz  Milk Transfer at this feeding:   Effective breastfeeding improved suck swallow breath cycle but still choking or coughing  Pumped: Type of Pump: Platinum    Quantity Pumped:     Total: 160ml  Initiation of Feeding: Infant initiates  Position of Feeding:    Left: football  Attachment Achieved: Easily and independently  Nipple shield:     Size: 24mm       Introduced by mom  Latch achieved yes  Suck Pattern at the breast: Chewing and Gulping, choking, lips NOT  turning cyanotic  Suck Pattern on the bottle: Gulping, Losing milk at the side more organized and actually backing off on her own to catch a breath  Behavior Following Observed Feeding: Content with pacifier for sucking  Nipple Pain: None     Latch: Mom latches independently, Latch difficulty without nipple shield and Latch difficulty: oral/motor issues  Suckling/Feeding: attaches, audible swallows, baby roots, clicks and elicits DAPHNE, pops off to catch her breath, always sucking as if to keep up, cannot pace herself  Milk Supply Available: oversupply     Maternal Diagnosis/Problem:  At risk for PPD  Lactation disorder, baby not transferring sufficient supply  Hyperlactation       BREASTFEEDING PLAN  Discussed concerns and symptoms  as listed above in assessment and guidance summarized below.  Topics reviewed included:    •  The nature of infants oral head/neck structure and function and its impact on latch and transfer of milk.   Discussed tongue immobility creating some of the problem, although frenulum is present there is good lift and extension. Would not refer to ENT at this time     •  Triple feeding and its sustainability and its impact on the mother baby relationship.   .  Hyperlactation (Oversupply)   Actively decreasing supply with decreasing pumpings will continue with that plan  Decrease pumping duration from 15 minutes to 10 and try to stretch pumpings to every 3.5 hours to eventually drop a stimulation.   Sudafed over the counter one dose, may repeat in 12-24 or 48 hours    • Foremilk Hind milk imbalance this is only problematic with high supply. With high supply high lactose load leading to heavy gut osmotic load causing frequent green, mucous stools, stooling during feeding and bothersome gas. I believe this imbalance is the underlying cause of slow gain along with much work at the breast or bottle    • Milk supply is more than sufficient    •  Feeding:   o May introduce breastfeeding as desired from 0-2 times per day, one breast at a time, approach as doing bottle where she eats, rests 10-20 minutes (diaper change) and then offer again.  o Feed your baby every 1.5-3 hours, more often if baby acts hungry.   o Awaken baby for feeding if going over 3 hours in the day.   o Until back to birth weight, ONE four hour at night is acceptable if has had 8 prior feedings in 24 hours.    o Continue to get in 10-11 feedings per 24 hours.   o Parents will follow through on OT evaluation    •  Supplement:   • Supplement with expressed milk    •  Nipple shield: Unable to sustain with bare breast, using 24mm Medela apprpriately   o Will wean when appropriate    - Positioning and Latch with Nipple shield mastered    •  Pumping  Guidelines:  ? Both breasts  o Pump 8 times in 24 hours plan to decrease to 7 or every 3.5 hours  - Decrease duration from 15 minutes even a minute per day or two  o Type of pump:  - Platinum and Spectra   - Always double pump    •  Connect with other mothers:  o Facebook:   - [place] Breast[facility]: [email or web address]  - Well-Nourished Babies (Private group for questions and support): https://www.[employer].com/groups/873191234089875/  o Breastfeeding [place]   - This is an emotional, informational and safe support [place] with your like-minded community that builds solidarity among moms  - The honest experiences of mothers are highly valued among the other mothers  - Tuesday  at 11am by [employer],  you will be sent an invitation.   - You may instead copy and paste this link:    [email or web address]    - Passcode  [id number]       Follow up Mom is encouraged to e-mail to update how the plan is working.  Pediatrician appointment: 8/30/21  Follow-up for infant weight check and dyad breastfeeding evaluation in 3 days  Please call [phone] if you have not scheduled your next appointment    A total of 80 minutes, not including infant assessment time, with more than 50% was spent preparing to see the patient, obtaining and reviewing separately obtained history, performing a medically appropriate examination and evaluation, counseling and educating the family,referred for bottle feeding direction and communicating with other health care professionals, documenting clinical information in the electronic health record, independently interpreting weighted feeds and infant growth results, communicating these results to the family and care coordination as detailed in the above note.       LENNY Healy.

## 2021-09-30 ENCOUNTER — OFFICE VISIT (OUTPATIENT)
Dept: OBGYN | Facility: CLINIC | Age: 26
End: 2021-09-30
Payer: COMMERCIAL

## 2021-09-30 DIAGNOSIS — O92.79 LACTATION DISORDER, POSTPARTUM CONDITION: ICD-10-CM

## 2021-09-30 DIAGNOSIS — N64.3 HYPERLACTATION: ICD-10-CM

## 2021-09-30 PROCEDURE — G2212 PROLONG OUTPT/OFFICE VIS: HCPCS | Performed by: NURSE PRACTITIONER

## 2021-09-30 PROCEDURE — 99215 OFFICE O/P EST HI 40 MIN: CPT | Performed by: NURSE PRACTITIONER

## 2021-09-30 NOTE — PROGRESS NOTES
Summary: Has exclusively been breastfeeding, moved to block to access milk fat, drawing off the first 45ml and using the rest of the milk for feedings. Using an alba and feeding 8 times per day, exclusively at the breast.  Still choking on the bottle and breast but no perioral cyanosis, Pooping with each feeding, no diaper rash, yellow liquid. She is working with Ped PT. Came to weight check group on Tuesday and baby had slowed growth, gaining 1.4oz in 12 days, discussed a brief plan and made appointment for today.   Today Baby removed 2.6oz from the breast with a significant amount of choking coughing and milk out the nose. Had fed 2.5 hours ago. We offered both breasts. Have tried block feeding for fat content and to decrease flow, documented baby had been able to take out 2.8oz from one breast,but baby not removing enough milk single sided consistently.  I think, high flow is helpful to even get an ounce out.  Baby is not being drowned with flow rather cannot manage suck swallow breathe.    Plan Reintroduce bottle for her mom and  to provide a break. She will have the same choking pattern but dad knows how to go slow and easy, upright positioning.   Continue breastfeeding if not bottle fed but back to both sides each time. Elsa clearly stops when she has had enough.   Continue sleep for 6-7 hours per night but increse number of feedings per 18 hours when she is awake to 1-2 feedings as possible. May provide 1tsp olive oil in divided doses (1/4tsp) in NS or bottle. Formula is not indicated.  Make appointment with pediatritian for tomorrow to discuss swallow study indications and other direction.   Mom is exhausted and teary, has good support.  Follow up Ped 10/1/21 and Lactation Tuesday at group  for a weight check, to adjust plan as needed.    Subjective:     Kathie Urban is a 26 y.o. female here for lactation care. She is here today with baby and her mother Delmy.    Concerns:   Latch on  difficulties  and oversupply  Choking on bottle and breast Evaluation of feeding and weight    HPI:   Pertinent  history: c/section  Mother does not have a history of advanced maternal age, GDM, hypertension prior to pregnancy, insulin resistance, multiple gestation, PCOS and thyroid disease. Common condition(s) which may interfere with milk supply.  Other risk factors history   of anxiety and depression    Breast changes in pregnancy: Yes  History of breast surgeries: No    Leaking in pg    FEEDING HISTORY:    Past breastfeeding history:  First baby   Hospital course: Pumping initiated and small flanges caused damage to nipples  Prior to consultation on 21: Difficulty with baby choking on breast and bottle, slowed growth, making 42-48oz per day of breastmilk. Since Monday has  3x/day for 15 min each breast then bottle which can take 45minutes. When only bottle and waiting until 3 hours feedings are 30 minutes. Pumps 8x/day, offers food every 2-2.5 hours, 8-10x/day.  Prior to consultation on 2021 Has exclusively pumped, drawing off the first 45ml and using the rest of the milk for feedings. Using an alba and feeding 9-11 times per day, usually 70ml. Minimal spitting up. Still choking on the bottle but less perioral cyanosis, dad gives the bottle slowly. Has found waiting 20 minutes after feeding, changing her then offering the last ounce is working better. Mom is pumping and instead of every 2 hours not doing every 3 hours, still making almost 1600ml per day. Pooping with each feeding  Currently 2021 Has exclusively been breastfeeding, moved to block to access fat, drawing off the first 45ml and using the rest of the milk for feedings. Using an alba and feeding 8 times per day, exclusively at the breast.      Both breasts: Single sided to reduce supply and increase fat intake  Bottle feeds: 0xx/24, has just started to reintroduce, baby feeds the same, prefers the breast    Supplement:  Expressed breast milk   How given/devices:  Bottle    Nipple Shield Use: 24 mm  Recommended by: self    Breast Pumping:   Frequency: 8x/day  Type of pump: Spectrum suction level 3  Flange size/type: 28.5mm  NO pain with pumping    Maternal ROS:  Constitutional: No fever, chills. Feeling well  Extremities Swelling: No extremity swelling  Breasts: No soreness of breasts and No soreness of nipples  Psychiatric: Managing ok  Mental Health: No mention of feeling irritable, agitated, angry, overwhelmed, apathy, exhaustion nor having sleep changes outside infant feeds/demands or appetite changes       Objective:     Maternal Physical   General: No acute distress  Breasts: Symetrical , Soft, Plugged Duct - no evidence and Mastitis  - no S/S  Nipples: intact  Psychiatric:  Normal mood and affect. Her behavior is normal. Judgment and thought content normal   Mental Health:  Did NOT exhibit sadness, agitation or hypervigilance. Is overwhelmed, exhausted and tearful.      Assessment/Plan & Lactation Counseling:     Infant exam on infant chart    Infant Weight History:   8/3/21   9#4oz  8/16/21  8#6.0oz Ped office  8/18/2021 8#5.9oz  8/23/21 8#9oz  8/24/2021 8#9.4oz  Scale to scale  3.5oz gain in 6 days.   9/16/21 9#6.5oz  9/28/21 9#7.9oz  9/30/2021 9#7.0oz    Infant intake at Breast:: Right: 1.1oz     Left 1.5oz   Total: 2.8oz  Milk Transfer at this feeding:   Effective breastfeeding improved suck swallow breath cycle but still choking or coughing  Initiation of Feeding: Infant initiates  Position of Feeding:    Cradle  Attachment Achieved: Easily and independently  Nipple shield:     Size: 24mm       Introduced by mom  [place] achieved yes  Suck Pattern at the breast: Chewing and Gulping, choking, lips NOT  turning cyanotic  Suck Pattern on the bottle: Gulping, Losing milk at the side more organized and actually backing off on her own to catch a breath  Behavior Following Observed Feeding: Content with pacifier for  sucking  Nipple Pain: None     Latch: Mom latches independently, Latch difficulty without nipple shield and Latch difficulty: oral/motor issues  Suckling/Feeding: attaches, audible swallows, baby roots, clicks and elicits DAPHNE, pops off to catch her breath, always sucking as if to keep up, cannot pace herself  Milk Supply Available: oversupply     Maternal Diagnosis/Problem:  At risk for PPD  Lactation disorder, baby not transferring sufficient supply  Hyperlactation       BREASTFEEDING PLAN  Discussed concerns and symptoms as listed above in assessment and guidance summarized below.  Topics reviewed included:    •  The nature of infants oral head/neck structure and function and its impact on latch and transfer of milk.   Discussed tongue immobility creating some of the problem, although frenulum is present there is good lift and extension. Would refer to ENT at this time, mom will discuss with ped     •  Triple feeding and its sustainability and its impact on the mother baby relationship.   .  Hyperlactation (Oversupply)   Actively decreasing supply with decreasing pumpings will continue with that plan  Decrease pumping duration from 15 minutes to 10 and try to stretch pumpings to every 3.5 hours to eventually drop a stimulation.     • Foremilk Hind milk imbalance is  problematic with this moms high supply. With high supply high lactose load leading to heavy gut osmotic load causing frequent green, mucous stools, stooling during feeding and bothersome gas. I believe this imbalance is part of the underlying cause of slow gain along with much work at the breast or bottle. Volume of intake is also the presenting problem now    • Milk supply is more than sufficient    •  Feeding:   o Continue breastfeeding  o Feed your baby every 1.5-2.5 hours, more often if baby acts hungry.   o Awaken baby for feeding if going over 2.5 hours in the day.     o Try to get in 10-11 feedings per 24 hours.   o Parents will continue with PT  evaluation, recommended OT too.    •  Supplement:   • Supplement with expressed milk    •  Nipple shield: Unable to sustain with bare breast, using 24mm Medela apprpriately   o Will wean when appropriate    - Positioning and Latch with Nipple shield mastered    •  Pumping Guidelines:  ? Both breasts  o Pump 8 times in 24 hours plan to decrease to 7 or every 3.5 hours  - Decrease duration  even a minute per day or two  o Type of pump:  - Platinum and Spectra   - Always double pump    •  Connect with other mothers:  o Weight Check Group  - Tuesdays 10am - 11am. [facility] at [address], 3rd floor conference room  - Come and check your baby's weight, do a feeding and see how your baby is growing, visit with other mothers, plan on a walk or coffee date after group.  • Please wear a mask  • Due to space limitations - no strollers please (Fresh c/section moms should use the stroller)  • We would love to have dads stay, but moms won't breastfeed.  • The room is only available from 10am -11am, there is a meeting prior and after.   • All diapers must be taken with you   o Facebook:   - [place] Breast[facility]: [email or web address]/[place].[email or web address]  - Well-Nourished Babies (Private group for questions and support): https://www.facebook.com/groups/917880977010717/  o Breastfeeding [place]   - This is an emotional, informational and safe support [place] with your like-minded community that builds solidarity among moms  - The honest experiences of mothers are highly valued among the other mothers  - Tuesday  at 11am by [place],  you will be sent an invitation.   - You may instead copy and paste this link:    [email or web address]    - Passcode  [id number]       Follow up Mom is encouraged to e-mail to update how the plan is working.  Pediatrician appointment: 10/1/21  Follow-up for infant weight check and dyad breastfeeding evaluation in 5 days  Please  call [phone] if you have not scheduled your next appointment    A total of 60 minutes, not including infant assessment time, with more than 50% was spent preparing to see the patient, obtaining and reviewing separately obtained history, performing a medically appropriate examination and evaluation, counseling and educating the family,referred for bottle feeding direction and communicating with other health care professionals, documenting clinical information in the electronic health record, independently interpreting weighted feeds and infant growth results, communicating these results to the family and care coordination as detailed in the above note.       LENNY Healy.

## 2023-07-09 SDOH — HEALTH STABILITY: PHYSICAL HEALTH: ON AVERAGE, HOW MANY MINUTES DO YOU ENGAGE IN EXERCISE AT THIS LEVEL?: 60 MIN

## 2023-07-09 SDOH — ECONOMIC STABILITY: INCOME INSECURITY: HOW HARD IS IT FOR YOU TO PAY FOR THE VERY BASICS LIKE FOOD, HOUSING, MEDICAL CARE, AND HEATING?: NOT VERY HARD

## 2023-07-09 SDOH — ECONOMIC STABILITY: FOOD INSECURITY: WITHIN THE PAST 12 MONTHS, YOU WORRIED THAT YOUR FOOD WOULD RUN OUT BEFORE YOU GOT MONEY TO BUY MORE.: NEVER TRUE

## 2023-07-09 SDOH — ECONOMIC STABILITY: INCOME INSECURITY: IN THE LAST 12 MONTHS, WAS THERE A TIME WHEN YOU WERE NOT ABLE TO PAY THE MORTGAGE OR RENT ON TIME?: NO

## 2023-07-09 SDOH — ECONOMIC STABILITY: HOUSING INSECURITY: IN THE LAST 12 MONTHS, HOW MANY PLACES HAVE YOU LIVED?: 1

## 2023-07-09 SDOH — HEALTH STABILITY: PHYSICAL HEALTH: ON AVERAGE, HOW MANY DAYS PER WEEK DO YOU ENGAGE IN MODERATE TO STRENUOUS EXERCISE (LIKE A BRISK WALK)?: 5 DAYS

## 2023-07-09 SDOH — ECONOMIC STABILITY: HOUSING INSECURITY
IN THE LAST 12 MONTHS, WAS THERE A TIME WHEN YOU DID NOT HAVE A STEADY PLACE TO SLEEP OR SLEPT IN A SHELTER (INCLUDING NOW)?: NO

## 2023-07-09 SDOH — ECONOMIC STABILITY: FOOD INSECURITY: WITHIN THE PAST 12 MONTHS, THE FOOD YOU BOUGHT JUST DIDN'T LAST AND YOU DIDN'T HAVE MONEY TO GET MORE.: NEVER TRUE

## 2023-07-09 SDOH — ECONOMIC STABILITY: TRANSPORTATION INSECURITY
IN THE PAST 12 MONTHS, HAS THE LACK OF TRANSPORTATION KEPT YOU FROM MEDICAL APPOINTMENTS OR FROM GETTING MEDICATIONS?: NO

## 2023-07-09 SDOH — ECONOMIC STABILITY: TRANSPORTATION INSECURITY
IN THE PAST 12 MONTHS, HAS LACK OF RELIABLE TRANSPORTATION KEPT YOU FROM MEDICAL APPOINTMENTS, MEETINGS, WORK OR FROM GETTING THINGS NEEDED FOR DAILY LIVING?: NO

## 2023-07-09 SDOH — HEALTH STABILITY: MENTAL HEALTH
STRESS IS WHEN SOMEONE FEELS TENSE, NERVOUS, ANXIOUS, OR CAN'T SLEEP AT NIGHT BECAUSE THEIR MIND IS TROUBLED. HOW STRESSED ARE YOU?: NOT AT ALL

## 2023-07-09 SDOH — ECONOMIC STABILITY: TRANSPORTATION INSECURITY
IN THE PAST 12 MONTHS, HAS LACK OF TRANSPORTATION KEPT YOU FROM MEETINGS, WORK, OR FROM GETTING THINGS NEEDED FOR DAILY LIVING?: NO

## 2023-07-09 ASSESSMENT — SOCIAL DETERMINANTS OF HEALTH (SDOH)
HOW HARD IS IT FOR YOU TO PAY FOR THE VERY BASICS LIKE FOOD, HOUSING, MEDICAL CARE, AND HEATING?: NOT VERY HARD
HOW OFTEN DO YOU ATTENT MEETINGS OF THE CLUB OR ORGANIZATION YOU BELONG TO?: NEVER
DO YOU BELONG TO ANY CLUBS OR ORGANIZATIONS SUCH AS CHURCH GROUPS UNIONS, FRATERNAL OR ATHLETIC GROUPS, OR SCHOOL GROUPS?: NO
HOW MANY DRINKS CONTAINING ALCOHOL DO YOU HAVE ON A TYPICAL DAY WHEN YOU ARE DRINKING: 1 OR 2
HOW OFTEN DO YOU ATTEND CHURCH OR RELIGIOUS SERVICES?: NEVER
WITHIN THE PAST 12 MONTHS, YOU WORRIED THAT YOUR FOOD WOULD RUN OUT BEFORE YOU GOT THE MONEY TO BUY MORE: NEVER TRUE
HOW OFTEN DO YOU GET TOGETHER WITH FRIENDS OR RELATIVES?: MORE THAN THREE TIMES A WEEK
HOW OFTEN DO YOU ATTENT MEETINGS OF THE CLUB OR ORGANIZATION YOU BELONG TO?: NEVER
HOW OFTEN DO YOU GET TOGETHER WITH FRIENDS OR RELATIVES?: MORE THAN THREE TIMES A WEEK
DO YOU BELONG TO ANY CLUBS OR ORGANIZATIONS SUCH AS CHURCH GROUPS UNIONS, FRATERNAL OR ATHLETIC GROUPS, OR SCHOOL GROUPS?: NO
HOW OFTEN DO YOU HAVE A DRINK CONTAINING ALCOHOL: MONTHLY OR LESS
IN A TYPICAL WEEK, HOW MANY TIMES DO YOU TALK ON THE PHONE WITH FAMILY, FRIENDS, OR NEIGHBORS?: MORE THAN THREE TIMES A WEEK
IN A TYPICAL WEEK, HOW MANY TIMES DO YOU TALK ON THE PHONE WITH FAMILY, FRIENDS, OR NEIGHBORS?: MORE THAN THREE TIMES A WEEK
HOW OFTEN DO YOU ATTEND CHURCH OR RELIGIOUS SERVICES?: NEVER
HOW OFTEN DO YOU HAVE SIX OR MORE DRINKS ON ONE OCCASION: NEVER

## 2023-07-09 ASSESSMENT — LIFESTYLE VARIABLES
AUDIT-C TOTAL SCORE: 1
HOW OFTEN DO YOU HAVE A DRINK CONTAINING ALCOHOL: MONTHLY OR LESS
HOW OFTEN DO YOU HAVE SIX OR MORE DRINKS ON ONE OCCASION: NEVER
SKIP TO QUESTIONS 9-10: 1
HOW MANY STANDARD DRINKS CONTAINING ALCOHOL DO YOU HAVE ON A TYPICAL DAY: 1 OR 2

## 2023-07-10 ENCOUNTER — OFFICE VISIT (OUTPATIENT)
Dept: MEDICAL GROUP | Facility: MEDICAL CENTER | Age: 28
End: 2023-07-10
Payer: COMMERCIAL

## 2023-07-10 VITALS
OXYGEN SATURATION: 99 % | WEIGHT: 209 LBS | BODY MASS INDEX: 32.8 KG/M2 | RESPIRATION RATE: 16 BRPM | DIASTOLIC BLOOD PRESSURE: 74 MMHG | TEMPERATURE: 97.4 F | SYSTOLIC BLOOD PRESSURE: 122 MMHG | HEART RATE: 103 BPM | HEIGHT: 67 IN

## 2023-07-10 DIAGNOSIS — Z11.59 NEED FOR HEPATITIS C SCREENING TEST: ICD-10-CM

## 2023-07-10 DIAGNOSIS — K90.49 FOOD INTOLERANCE: ICD-10-CM

## 2023-07-10 DIAGNOSIS — R14.1 GAS PAIN: ICD-10-CM

## 2023-07-10 DIAGNOSIS — E66.9 OBESITY (BMI 30-39.9): ICD-10-CM

## 2023-07-10 DIAGNOSIS — R63.5 WEIGHT GAIN: ICD-10-CM

## 2023-07-10 DIAGNOSIS — K64.9 HEMORRHOIDS, UNSPECIFIED HEMORRHOID TYPE: ICD-10-CM

## 2023-07-10 PROCEDURE — 3078F DIAST BP <80 MM HG: CPT

## 2023-07-10 PROCEDURE — 3074F SYST BP LT 130 MM HG: CPT

## 2023-07-10 PROCEDURE — 99204 OFFICE O/P NEW MOD 45 MIN: CPT

## 2023-07-10 RX ORDER — BENZOCAINE/MENTHOL 6 MG-10 MG
1 LOZENGE MUCOUS MEMBRANE 2 TIMES DAILY
Qty: 1.5 G | Refills: 1 | Status: SHIPPED | OUTPATIENT
Start: 2023-07-10

## 2023-07-10 RX ORDER — HYDROCORTISONE ACETATE 25 MG/1
25 SUPPOSITORY RECTAL EVERY 12 HOURS
Qty: 24 SUPPOSITORY | Refills: 3 | Status: SHIPPED | OUTPATIENT
Start: 2023-07-10

## 2023-07-10 ASSESSMENT — ENCOUNTER SYMPTOMS
FEVER: 0
SHORTNESS OF BREATH: 0
COUGH: 0
ORTHOPNEA: 0
CHILLS: 0
PALPITATIONS: 0

## 2023-07-10 ASSESSMENT — PATIENT HEALTH QUESTIONNAIRE - PHQ9: CLINICAL INTERPRETATION OF PHQ2 SCORE: 0

## 2023-07-10 NOTE — PROGRESS NOTES
"Subjective:     CC: Establish Care and Gas      HISTORY OF THE PRESENT ILLNESS: Kathie is a 28 y.o. female here to establish care and discuss:   Hemorrhoid, worsening: suppositories were helpful, no longer working. OTC creams not helping. Rarely bleeds, worse after her daughter was born. It has been bothering her the last 3-4 months.     Bloating/ gas/ food intolerance/ GERD:She has cut out multiple foods. Her symptoms have been consistent, she has tried cutting out eggs, gluten and dairy which has helped. GERD is triggered by garlic, and mexican foods. But lately it has been getting worse with less irritating foods like cheerios and gram crackers as well. She has used tums which is helpful. She is still breast feeding her daughter. She has been taking fiber supplements, which has been causing her diarrhea.      Rapid weight gain/ obesity: in the last  4-6 months, 40 lbs gained. She has cut back on walking due to pain from her hemorrhoid. She has cut back on certain foods because of the bloating. She denies cold intolerance, she has been fatigued due to having a young child and breast feeding.       Previous PCP: Domi Woods   Allergies: No Known Allergies  Medications:   Current Outpatient Medications:     etonogestrel (NEXPLANON) 68 MG Implant implant, , Disp: , Rfl:     hydrocortisone (ANUSOL-HC) 25 MG Suppos, Insert 1 Suppository into the rectum every 12 hours., Disp: 24 Suppository, Rfl: 3    hydrocortisone 1 % Cream, Apply 1 Application topically 2 times a day., Disp: 1.5 g, Rfl: 1    ROS:   Review of Systems   Constitutional:  Negative for chills and fever.   Respiratory:  Negative for cough and shortness of breath.    Cardiovascular:  Negative for chest pain, palpitations, orthopnea and leg swelling.          Objective:     Exam:   /74 (BP Location: Left arm, Patient Position: Sitting, BP Cuff Size: Adult)   Pulse (!) 103   Temp 36.3 °C (97.4 °F) (Temporal)   Resp 16   Ht 1.702 m (5' 7\")   " Wt 94.8 kg (209 lb)   SpO2 99%  Body mass index is 32.73 kg/m².    Physical Exam  Constitutional:       Appearance: Normal appearance. She is obese.   Eyes:      Pupils: Pupils are equal, round, and reactive to light.   Cardiovascular:      Rate and Rhythm: Normal rate and regular rhythm.      Pulses: Normal pulses.      Heart sounds: Normal heart sounds.   Pulmonary:      Effort: Pulmonary effort is normal.      Breath sounds: Normal breath sounds.   Abdominal:      General: Bowel sounds are normal.      Palpations: Abdomen is soft.   Neurological:      Mental Status: She is alert and oriented to person, place, and time.   Psychiatric:         Mood and Affect: Mood normal.         Behavior: Behavior normal.           Assessment & Plan:   28 y.o. female with the following -    1. Hemorrhoids, unspecified hemorrhoid type  Chronic, unstable  Symptoms have been worse over the last few months. OTC treatments are no longer helping.  - Referral to General Surgery  - referral to Gastroenterology  - hydrocortisone (ANUSOL-HC) 25 MG Suppos; Insert 1 Suppository into the rectum every 12 hours.  Dispense: 24 Suppository; Refill: 3  - hydrocortisone 1 % Cream; Apply 1 Application topically 2 times a day.  Dispense: 1.5 g; Refill: 1    2. Gas pain  3. Food intolerance  Undiagnosed problem with uncertain prognosis  She has cut out gluten and dairy which has helped. She states that these have bothered her most of her life. We discussed the LOWFODMAP diet, she plans to keep a food gournal  - TRANSGLUTAMINASE ANTIBODIES; Future  - IGA SERUM QUANT; Future  - Referral to Allergy  - referral to Gastroenterology    4. Weight gain  5. Obesity (BMI 30-39.9)  Undiagnosed problem with uncertain prognosis  She denies any specific dietary or lifestyle changes to account for her weight gain, or her inability to lose weight. She states this has been a struggle for the last 4-6 months.   - HEMOGLOBIN A1C; Future  - TSH WITH REFLEX TO FT4;  Future  - Comp Metabolic Panel; Future  - Lipid Profile; Future  - CBC WITHOUT DIFFERENTIAL; Future  - Patient identified as having weight management issue.  Appropriate orders and counseling given.  - Referral to gastroenterology    6. Need for hepatitis C screening test  - HEP C VIRUS ANTIBODY; Future    Other orders  - etonogestrel (NEXPLANON) 68 MG Implant implant      Anticipatory guidance  Patient counseled about skin care, diet, supplements, prenatal vitamins, safe sex and exercise, smoking, drugs/alcohol use, and wearing a seat belt.       Return if symptoms worsen or fail to improve.    Please note that this dictation was created using voice recognition software. I have made every reasonable attempt to correct obvious errors, but I expect that there are errors of grammar and possibly content that I did not discover before finalizing the note.

## 2023-07-18 ENCOUNTER — HOSPITAL ENCOUNTER (OUTPATIENT)
Dept: LAB | Facility: MEDICAL CENTER | Age: 28
End: 2023-07-18
Payer: COMMERCIAL

## 2023-07-18 DIAGNOSIS — K90.49 FOOD INTOLERANCE: ICD-10-CM

## 2023-07-18 DIAGNOSIS — Z11.59 NEED FOR HEPATITIS C SCREENING TEST: ICD-10-CM

## 2023-07-18 DIAGNOSIS — E66.9 OBESITY (BMI 30-39.9): ICD-10-CM

## 2023-07-18 LAB
ALBUMIN SERPL BCP-MCNC: 4.5 G/DL (ref 3.2–4.9)
ALBUMIN/GLOB SERPL: 1.7 G/DL
ALP SERPL-CCNC: 45 U/L (ref 30–99)
ALT SERPL-CCNC: 16 U/L (ref 2–50)
ANION GAP SERPL CALC-SCNC: 10 MMOL/L (ref 7–16)
AST SERPL-CCNC: 19 U/L (ref 12–45)
BILIRUB SERPL-MCNC: 0.4 MG/DL (ref 0.1–1.5)
BUN SERPL-MCNC: 8 MG/DL (ref 8–22)
CALCIUM ALBUM COR SERPL-MCNC: 9 MG/DL (ref 8.5–10.5)
CALCIUM SERPL-MCNC: 9.4 MG/DL (ref 8.5–10.5)
CHLORIDE SERPL-SCNC: 103 MMOL/L (ref 96–112)
CHOLEST SERPL-MCNC: 156 MG/DL (ref 100–199)
CO2 SERPL-SCNC: 23 MMOL/L (ref 20–33)
CREAT SERPL-MCNC: 0.56 MG/DL (ref 0.5–1.4)
ERYTHROCYTE [DISTWIDTH] IN BLOOD BY AUTOMATED COUNT: 39.9 FL (ref 35.9–50)
EST. AVERAGE GLUCOSE BLD GHB EST-MCNC: 100 MG/DL
GFR SERPLBLD CREATININE-BSD FMLA CKD-EPI: 126 ML/MIN/1.73 M 2
GLOBULIN SER CALC-MCNC: 2.7 G/DL (ref 1.9–3.5)
GLUCOSE SERPL-MCNC: 92 MG/DL (ref 65–99)
HBA1C MFR BLD: 5.1 % (ref 4–5.6)
HCT VFR BLD AUTO: 45.3 % (ref 37–47)
HCV AB SER QL: NORMAL
HDLC SERPL-MCNC: 40 MG/DL
HGB BLD-MCNC: 14.9 G/DL (ref 12–16)
LDLC SERPL CALC-MCNC: 93 MG/DL
MCH RBC QN AUTO: 28.7 PG (ref 27–33)
MCHC RBC AUTO-ENTMCNC: 32.9 G/DL (ref 32.2–35.5)
MCV RBC AUTO: 87.3 FL (ref 81.4–97.8)
PLATELET # BLD AUTO: 321 K/UL (ref 164–446)
PMV BLD AUTO: 10.1 FL (ref 9–12.9)
POTASSIUM SERPL-SCNC: 3.8 MMOL/L (ref 3.6–5.5)
PROT SERPL-MCNC: 7.2 G/DL (ref 6–8.2)
RBC # BLD AUTO: 5.19 M/UL (ref 4.2–5.4)
SODIUM SERPL-SCNC: 136 MMOL/L (ref 135–145)
TRIGL SERPL-MCNC: 117 MG/DL (ref 0–149)
TSH SERPL DL<=0.005 MIU/L-ACNC: 1.74 UIU/ML (ref 0.38–5.33)
WBC # BLD AUTO: 7.7 K/UL (ref 4.8–10.8)

## 2023-07-18 PROCEDURE — 80061 LIPID PANEL: CPT

## 2023-07-18 PROCEDURE — 82784 ASSAY IGA/IGD/IGG/IGM EACH: CPT

## 2023-07-18 PROCEDURE — 80053 COMPREHEN METABOLIC PANEL: CPT

## 2023-07-18 PROCEDURE — 86364 TISS TRNSGLTMNASE EA IG CLAS: CPT

## 2023-07-18 PROCEDURE — 85027 COMPLETE CBC AUTOMATED: CPT

## 2023-07-18 PROCEDURE — 36415 COLL VENOUS BLD VENIPUNCTURE: CPT

## 2023-07-18 PROCEDURE — 83036 HEMOGLOBIN GLYCOSYLATED A1C: CPT

## 2023-07-18 PROCEDURE — 86803 HEPATITIS C AB TEST: CPT

## 2023-07-18 PROCEDURE — 84443 ASSAY THYROID STIM HORMONE: CPT

## 2023-07-19 LAB — TTG IGA SER IA-ACNC: <2 U/ML (ref 0–3)

## 2023-07-20 LAB
IGA SERPL-MCNC: 160 MG/DL (ref 68–408)
TTG IGG SER IA-ACNC: 2 U/ML (ref 0–5)

## 2024-11-12 ENCOUNTER — APPOINTMENT (OUTPATIENT)
Dept: ADMISSIONS | Facility: MEDICAL CENTER | Age: 29
End: 2024-11-12
Attending: OBSTETRICS & GYNECOLOGY
Payer: COMMERCIAL

## 2024-11-18 ENCOUNTER — PRE-ADMISSION TESTING (OUTPATIENT)
Dept: ADMISSIONS | Facility: MEDICAL CENTER | Age: 29
End: 2024-11-18
Attending: OBSTETRICS & GYNECOLOGY
Payer: COMMERCIAL

## 2024-12-02 ENCOUNTER — ANESTHESIA EVENT (OUTPATIENT)
Dept: OBGYN | Facility: MEDICAL CENTER | Age: 29
End: 2024-12-02
Payer: COMMERCIAL

## 2024-12-02 ENCOUNTER — ANESTHESIA (OUTPATIENT)
Dept: OBGYN | Facility: MEDICAL CENTER | Age: 29
End: 2024-12-02
Payer: COMMERCIAL

## 2024-12-02 ENCOUNTER — HOSPITAL ENCOUNTER (INPATIENT)
Facility: MEDICAL CENTER | Age: 29
LOS: 2 days | End: 2024-12-04
Attending: OBSTETRICS & GYNECOLOGY | Admitting: OBSTETRICS & GYNECOLOGY
Payer: COMMERCIAL

## 2024-12-02 DIAGNOSIS — G89.18 POSTOPERATIVE PAIN: ICD-10-CM

## 2024-12-02 LAB
BASOPHILS # BLD AUTO: 0.4 % (ref 0–1.8)
BASOPHILS # BLD: 0.05 K/UL (ref 0–0.12)
EOSINOPHIL # BLD AUTO: 0.26 K/UL (ref 0–0.51)
EOSINOPHIL NFR BLD: 2.1 % (ref 0–6.9)
ERYTHROCYTE [DISTWIDTH] IN BLOOD BY AUTOMATED COUNT: 39.3 FL (ref 35.9–50)
ERYTHROCYTE [DISTWIDTH] IN BLOOD BY AUTOMATED COUNT: 40.1 FL (ref 35.9–50)
HCT VFR BLD AUTO: 33.3 % (ref 37–47)
HCT VFR BLD AUTO: 34.7 % (ref 37–47)
HGB BLD-MCNC: 10.9 G/DL (ref 12–16)
HGB BLD-MCNC: 11.7 G/DL (ref 12–16)
HOLDING TUBE BB 8507: NORMAL
IMM GRANULOCYTES # BLD AUTO: 0.11 K/UL (ref 0–0.11)
IMM GRANULOCYTES NFR BLD AUTO: 0.9 % (ref 0–0.9)
IMMUNE ROSETTING TEST 8505FMH: NORMAL
LYMPHOCYTES # BLD AUTO: 1.69 K/UL (ref 1–4.8)
LYMPHOCYTES NFR BLD: 13.8 % (ref 22–41)
MCH RBC QN AUTO: 25.9 PG (ref 27–33)
MCH RBC QN AUTO: 26.7 PG (ref 27–33)
MCHC RBC AUTO-ENTMCNC: 32.7 G/DL (ref 32.2–35.5)
MCHC RBC AUTO-ENTMCNC: 33.7 G/DL (ref 32.2–35.5)
MCV RBC AUTO: 79 FL (ref 81.4–97.8)
MCV RBC AUTO: 79.1 FL (ref 81.4–97.8)
MONOCYTES # BLD AUTO: 0.5 K/UL (ref 0–0.85)
MONOCYTES NFR BLD AUTO: 4.1 % (ref 0–13.4)
NEUTROPHILS # BLD AUTO: 9.66 K/UL (ref 1.82–7.42)
NEUTROPHILS NFR BLD: 78.7 % (ref 44–72)
NRBC # BLD AUTO: 0 K/UL
NRBC BLD-RTO: 0 /100 WBC (ref 0–0.2)
NUMBER OF RH DOSES IND 8505RD: 1
PLATELET # BLD AUTO: 222 K/UL (ref 164–446)
PLATELET # BLD AUTO: 224 K/UL (ref 164–446)
PMV BLD AUTO: 9.3 FL (ref 9–12.9)
PMV BLD AUTO: 9.7 FL (ref 9–12.9)
RBC # BLD AUTO: 4.21 M/UL (ref 4.2–5.4)
RBC # BLD AUTO: 4.39 M/UL (ref 4.2–5.4)
RH BLD: NORMAL
T PALLIDUM AB SER QL IA: NONREACTIVE
WBC # BLD AUTO: 12.3 K/UL (ref 4.8–10.8)
WBC # BLD AUTO: 15.7 K/UL (ref 4.8–10.8)

## 2024-12-02 PROCEDURE — 700102 HCHG RX REV CODE 250 W/ 637 OVERRIDE(OP): Performed by: ANESTHESIOLOGY

## 2024-12-02 PROCEDURE — 160035 HCHG PACU - 1ST 60 MINS PHASE I: Performed by: OBSTETRICS & GYNECOLOGY

## 2024-12-02 PROCEDURE — 700111 HCHG RX REV CODE 636 W/ 250 OVERRIDE (IP): Mod: JZ | Performed by: OBSTETRICS & GYNECOLOGY

## 2024-12-02 PROCEDURE — 700102 HCHG RX REV CODE 250 W/ 637 OVERRIDE(OP)

## 2024-12-02 PROCEDURE — 160002 HCHG RECOVERY MINUTES (STAT): Performed by: OBSTETRICS & GYNECOLOGY

## 2024-12-02 PROCEDURE — A9270 NON-COVERED ITEM OR SERVICE: HCPCS | Performed by: OBSTETRICS & GYNECOLOGY

## 2024-12-02 PROCEDURE — 700111 HCHG RX REV CODE 636 W/ 250 OVERRIDE (IP): Performed by: OBSTETRICS & GYNECOLOGY

## 2024-12-02 PROCEDURE — A9270 NON-COVERED ITEM OR SERVICE: HCPCS | Performed by: ANESTHESIOLOGY

## 2024-12-02 PROCEDURE — 700105 HCHG RX REV CODE 258: Performed by: ANESTHESIOLOGY

## 2024-12-02 PROCEDURE — 160048 HCHG OR STATISTICAL LEVEL 1-5: Performed by: OBSTETRICS & GYNECOLOGY

## 2024-12-02 PROCEDURE — C1755 CATHETER, INTRASPINAL: HCPCS | Performed by: OBSTETRICS & GYNECOLOGY

## 2024-12-02 PROCEDURE — 36415 COLL VENOUS BLD VENIPUNCTURE: CPT

## 2024-12-02 PROCEDURE — 700111 HCHG RX REV CODE 636 W/ 250 OVERRIDE (IP): Mod: JZ

## 2024-12-02 PROCEDURE — 160029 HCHG SURGERY MINUTES - 1ST 30 MINS LEVEL 4: Performed by: OBSTETRICS & GYNECOLOGY

## 2024-12-02 PROCEDURE — 85027 COMPLETE CBC AUTOMATED: CPT

## 2024-12-02 PROCEDURE — A9270 NON-COVERED ITEM OR SERVICE: HCPCS

## 2024-12-02 PROCEDURE — 85461 HEMOGLOBIN FETAL: CPT

## 2024-12-02 PROCEDURE — 86901 BLOOD TYPING SEROLOGIC RH(D): CPT

## 2024-12-02 PROCEDURE — 700111 HCHG RX REV CODE 636 W/ 250 OVERRIDE (IP): Mod: JZ | Performed by: ANESTHESIOLOGY

## 2024-12-02 PROCEDURE — 700102 HCHG RX REV CODE 250 W/ 637 OVERRIDE(OP): Performed by: OBSTETRICS & GYNECOLOGY

## 2024-12-02 PROCEDURE — 160009 HCHG ANES TIME/MIN: Performed by: OBSTETRICS & GYNECOLOGY

## 2024-12-02 PROCEDURE — 700105 HCHG RX REV CODE 258: Performed by: OBSTETRICS & GYNECOLOGY

## 2024-12-02 PROCEDURE — 86780 TREPONEMA PALLIDUM: CPT

## 2024-12-02 PROCEDURE — 160041 HCHG SURGERY MINUTES - EA ADDL 1 MIN LEVEL 4: Performed by: OBSTETRICS & GYNECOLOGY

## 2024-12-02 PROCEDURE — 770002 HCHG ROOM/CARE - OB PRIVATE (112)

## 2024-12-02 PROCEDURE — 85025 COMPLETE CBC W/AUTO DIFF WBC: CPT

## 2024-12-02 RX ORDER — DIPHENHYDRAMINE HYDROCHLORIDE 50 MG/ML
12.5 INJECTION INTRAMUSCULAR; INTRAVENOUS
Status: DISCONTINUED | OUTPATIENT
Start: 2024-12-02 | End: 2024-12-02 | Stop reason: HOSPADM

## 2024-12-02 RX ORDER — DIPHENHYDRAMINE HYDROCHLORIDE 50 MG/ML
25 INJECTION INTRAMUSCULAR; INTRAVENOUS EVERY 6 HOURS PRN
Status: ACTIVE | OUTPATIENT
Start: 2024-12-02 | End: 2024-12-03

## 2024-12-02 RX ORDER — IBUPROFEN 800 MG/1
800 TABLET, FILM COATED ORAL EVERY 8 HOURS PRN
Status: DISCONTINUED | OUTPATIENT
Start: 2024-12-06 | End: 2024-12-04 | Stop reason: HOSPADM

## 2024-12-02 RX ORDER — SIMETHICONE 125 MG
125 TABLET,CHEWABLE ORAL 4 TIMES DAILY PRN
Status: DISCONTINUED | OUTPATIENT
Start: 2024-12-02 | End: 2024-12-04 | Stop reason: HOSPADM

## 2024-12-02 RX ORDER — BISACODYL 10 MG
10 SUPPOSITORY, RECTAL RECTAL PRN
Status: DISCONTINUED | OUTPATIENT
Start: 2024-12-02 | End: 2024-12-04 | Stop reason: HOSPADM

## 2024-12-02 RX ORDER — ALBUTEROL SULFATE 5 MG/ML
2.5 SOLUTION RESPIRATORY (INHALATION)
Status: DISCONTINUED | OUTPATIENT
Start: 2024-12-02 | End: 2024-12-02 | Stop reason: HOSPADM

## 2024-12-02 RX ORDER — OXYTOCIN 10 [USP'U]/ML
INJECTION, SOLUTION INTRAMUSCULAR; INTRAVENOUS PRN
Status: DISCONTINUED | OUTPATIENT
Start: 2024-12-02 | End: 2024-12-02 | Stop reason: SURG

## 2024-12-02 RX ORDER — HALOPERIDOL 5 MG/ML
1 INJECTION INTRAMUSCULAR
Status: DISCONTINUED | OUTPATIENT
Start: 2024-12-02 | End: 2024-12-02 | Stop reason: HOSPADM

## 2024-12-02 RX ORDER — MISOPROSTOL 200 UG/1
800 TABLET ORAL
Status: DISCONTINUED | OUTPATIENT
Start: 2024-12-02 | End: 2024-12-04 | Stop reason: HOSPADM

## 2024-12-02 RX ORDER — VITAMIN A ACETATE, BETA CAROTENE, ASCORBIC ACID, CHOLECALCIFEROL, .ALPHA.-TOCOPHEROL ACETATE, DL-, THIAMINE MONONITRATE, RIBOFLAVIN, NIACINAMIDE, PYRIDOXINE HYDROCHLORIDE, FOLIC ACID, CYANOCOBALAMIN, CALCIUM CARBONATE, FERROUS FUMARATE, ZINC OXIDE, CUPRIC OXIDE 3080; 12; 120; 400; 1; 1.84; 3; 20; 22; 920; 25; 200; 27; 10; 2 [IU]/1; UG/1; MG/1; [IU]/1; MG/1; MG/1; MG/1; MG/1; MG/1; [IU]/1; MG/1; MG/1; MG/1; MG/1; MG/1
1 TABLET, FILM COATED ORAL
Status: DISCONTINUED | OUTPATIENT
Start: 2024-12-02 | End: 2024-12-04 | Stop reason: HOSPADM

## 2024-12-02 RX ORDER — OXYTOCIN 10 [USP'U]/ML
10 INJECTION, SOLUTION INTRAMUSCULAR; INTRAVENOUS
Status: DISCONTINUED | OUTPATIENT
Start: 2024-12-02 | End: 2024-12-04 | Stop reason: HOSPADM

## 2024-12-02 RX ORDER — SODIUM CHLORIDE 9 MG/ML
INJECTION, SOLUTION INTRAVENOUS CONTINUOUS
Status: DISCONTINUED | OUTPATIENT
Start: 2024-12-02 | End: 2024-12-04 | Stop reason: HOSPADM

## 2024-12-02 RX ORDER — IBUPROFEN 800 MG/1
800 TABLET, FILM COATED ORAL EVERY 8 HOURS
Status: DISCONTINUED | OUTPATIENT
Start: 2024-12-03 | End: 2024-12-04 | Stop reason: HOSPADM

## 2024-12-02 RX ORDER — DIPHENHYDRAMINE HCL 25 MG
25 TABLET ORAL EVERY 6 HOURS PRN
Status: DISCONTINUED | OUTPATIENT
Start: 2024-12-03 | End: 2024-12-04 | Stop reason: HOSPADM

## 2024-12-02 RX ORDER — OXYCODONE HYDROCHLORIDE 5 MG/1
5 TABLET ORAL EVERY 4 HOURS PRN
Status: DISCONTINUED | OUTPATIENT
Start: 2024-12-03 | End: 2024-12-04 | Stop reason: HOSPADM

## 2024-12-02 RX ORDER — METHYLERGONOVINE MALEATE 0.2 MG/ML
0.2 INJECTION INTRAVENOUS
Status: DISCONTINUED | OUTPATIENT
Start: 2024-12-02 | End: 2024-12-04 | Stop reason: HOSPADM

## 2024-12-02 RX ORDER — CITRIC ACID/SODIUM CITRATE 334-500MG
30 SOLUTION, ORAL ORAL ONCE
Status: COMPLETED | OUTPATIENT
Start: 2024-12-02 | End: 2024-12-02

## 2024-12-02 RX ORDER — SODIUM CHLORIDE 9 MG/ML
INJECTION, SOLUTION INTRAVENOUS ONCE
Status: DISCONTINUED | OUTPATIENT
Start: 2024-12-02 | End: 2024-12-04 | Stop reason: HOSPADM

## 2024-12-02 RX ORDER — CARBOPROST TROMETHAMINE 250 UG/ML
250 INJECTION, SOLUTION INTRAMUSCULAR
Status: DISCONTINUED | OUTPATIENT
Start: 2024-12-02 | End: 2024-12-04 | Stop reason: HOSPADM

## 2024-12-02 RX ORDER — ONDANSETRON 2 MG/ML
4 INJECTION INTRAMUSCULAR; INTRAVENOUS EVERY 6 HOURS PRN
Status: DISCONTINUED | OUTPATIENT
Start: 2024-12-03 | End: 2024-12-04 | Stop reason: HOSPADM

## 2024-12-02 RX ORDER — KETOROLAC TROMETHAMINE 15 MG/ML
15 INJECTION, SOLUTION INTRAMUSCULAR; INTRAVENOUS EVERY 6 HOURS
Status: COMPLETED | OUTPATIENT
Start: 2024-12-02 | End: 2024-12-03

## 2024-12-02 RX ORDER — OXYCODONE HYDROCHLORIDE 10 MG/1
10 TABLET ORAL EVERY 4 HOURS PRN
Status: DISCONTINUED | OUTPATIENT
Start: 2024-12-03 | End: 2024-12-04 | Stop reason: HOSPADM

## 2024-12-02 RX ORDER — HYDROMORPHONE HYDROCHLORIDE 1 MG/ML
0.2 INJECTION, SOLUTION INTRAMUSCULAR; INTRAVENOUS; SUBCUTANEOUS
Status: DISCONTINUED | OUTPATIENT
Start: 2024-12-02 | End: 2024-12-02 | Stop reason: HOSPADM

## 2024-12-02 RX ORDER — ACETAMINOPHEN 500 MG
1000 TABLET ORAL EVERY 8 HOURS
Status: COMPLETED | OUTPATIENT
Start: 2024-12-02 | End: 2024-12-03

## 2024-12-02 RX ORDER — DIPHENHYDRAMINE HYDROCHLORIDE 50 MG/ML
25 INJECTION INTRAMUSCULAR; INTRAVENOUS EVERY 6 HOURS PRN
Status: DISCONTINUED | OUTPATIENT
Start: 2024-12-03 | End: 2024-12-04 | Stop reason: HOSPADM

## 2024-12-02 RX ORDER — SODIUM CHLORIDE 9 MG/ML
1000 INJECTION, SOLUTION INTRAVENOUS ONCE
Status: DISCONTINUED | OUTPATIENT
Start: 2024-12-02 | End: 2024-12-02 | Stop reason: HOSPADM

## 2024-12-02 RX ORDER — DIPHENHYDRAMINE HYDROCHLORIDE 50 MG/ML
12.5 INJECTION INTRAMUSCULAR; INTRAVENOUS EVERY 6 HOURS PRN
Status: ACTIVE | OUTPATIENT
Start: 2024-12-02 | End: 2024-12-03

## 2024-12-02 RX ORDER — HYDROMORPHONE HYDROCHLORIDE 1 MG/ML
0.4 INJECTION, SOLUTION INTRAMUSCULAR; INTRAVENOUS; SUBCUTANEOUS
Status: DISCONTINUED | OUTPATIENT
Start: 2024-12-02 | End: 2024-12-02 | Stop reason: HOSPADM

## 2024-12-02 RX ORDER — SODIUM CHLORIDE, SODIUM LACTATE, POTASSIUM CHLORIDE, AND CALCIUM CHLORIDE .6; .31; .03; .02 G/100ML; G/100ML; G/100ML; G/100ML
1000 INJECTION, SOLUTION INTRAVENOUS ONCE
Status: COMPLETED | OUTPATIENT
Start: 2024-12-02 | End: 2024-12-02

## 2024-12-02 RX ORDER — ACETAMINOPHEN 500 MG
1000 TABLET ORAL EVERY 6 HOURS
Status: DISCONTINUED | OUTPATIENT
Start: 2024-12-03 | End: 2024-12-04 | Stop reason: HOSPADM

## 2024-12-02 RX ORDER — OXYCODONE HYDROCHLORIDE 10 MG/1
10 TABLET ORAL EVERY 4 HOURS PRN
Status: ACTIVE | OUTPATIENT
Start: 2024-12-02 | End: 2024-12-03

## 2024-12-02 RX ORDER — CEFAZOLIN SODIUM 1 G/3ML
2 INJECTION, POWDER, FOR SOLUTION INTRAMUSCULAR; INTRAVENOUS ONCE
Status: COMPLETED | OUTPATIENT
Start: 2024-12-02 | End: 2024-12-02

## 2024-12-02 RX ORDER — KETOROLAC TROMETHAMINE 15 MG/ML
INJECTION, SOLUTION INTRAMUSCULAR; INTRAVENOUS PRN
Status: DISCONTINUED | OUTPATIENT
Start: 2024-12-02 | End: 2024-12-02 | Stop reason: SURG

## 2024-12-02 RX ORDER — OXYCODONE HCL 5 MG/5 ML
10 SOLUTION, ORAL ORAL
Status: DISCONTINUED | OUTPATIENT
Start: 2024-12-02 | End: 2024-12-02 | Stop reason: HOSPADM

## 2024-12-02 RX ORDER — CALCIUM CARBONATE 500 MG/1
1000 TABLET, CHEWABLE ORAL EVERY 6 HOURS PRN
Status: DISCONTINUED | OUTPATIENT
Start: 2024-12-02 | End: 2024-12-04 | Stop reason: HOSPADM

## 2024-12-02 RX ORDER — METOCLOPRAMIDE HYDROCHLORIDE 5 MG/ML
INJECTION INTRAMUSCULAR; INTRAVENOUS
Status: COMPLETED
Start: 2024-12-02 | End: 2024-12-02

## 2024-12-02 RX ORDER — METOCLOPRAMIDE HYDROCHLORIDE 5 MG/ML
10 INJECTION INTRAMUSCULAR; INTRAVENOUS ONCE
Status: COMPLETED | OUTPATIENT
Start: 2024-12-02 | End: 2024-12-02

## 2024-12-02 RX ORDER — CITRIC ACID/SODIUM CITRATE 334-500MG
SOLUTION, ORAL ORAL
Status: COMPLETED
Start: 2024-12-02 | End: 2024-12-02

## 2024-12-02 RX ORDER — SCOLOPAMINE TRANSDERMAL SYSTEM 1 MG/1
PATCH, EXTENDED RELEASE TRANSDERMAL PRN
Status: DISCONTINUED | OUTPATIENT
Start: 2024-12-02 | End: 2024-12-02 | Stop reason: SURG

## 2024-12-02 RX ORDER — ACETAMINOPHEN 500 MG
1000 TABLET ORAL EVERY 6 HOURS PRN
Status: DISCONTINUED | OUTPATIENT
Start: 2024-12-06 | End: 2024-12-04 | Stop reason: HOSPADM

## 2024-12-02 RX ORDER — SODIUM CHLORIDE, SODIUM LACTATE, POTASSIUM CHLORIDE, CALCIUM CHLORIDE 600; 310; 30; 20 MG/100ML; MG/100ML; MG/100ML; MG/100ML
INJECTION, SOLUTION INTRAVENOUS CONTINUOUS
Status: DISCONTINUED | OUTPATIENT
Start: 2024-12-02 | End: 2024-12-04 | Stop reason: HOSPADM

## 2024-12-02 RX ORDER — DEXAMETHASONE SODIUM PHOSPHATE 4 MG/ML
INJECTION, SOLUTION INTRA-ARTICULAR; INTRALESIONAL; INTRAMUSCULAR; INTRAVENOUS; SOFT TISSUE PRN
Status: DISCONTINUED | OUTPATIENT
Start: 2024-12-02 | End: 2024-12-02 | Stop reason: SURG

## 2024-12-02 RX ORDER — OXYCODONE HCL 5 MG/5 ML
5 SOLUTION, ORAL ORAL
Status: DISCONTINUED | OUTPATIENT
Start: 2024-12-02 | End: 2024-12-02 | Stop reason: HOSPADM

## 2024-12-02 RX ORDER — HYDROMORPHONE HYDROCHLORIDE 1 MG/ML
0.2 INJECTION, SOLUTION INTRAMUSCULAR; INTRAVENOUS; SUBCUTANEOUS
Status: ACTIVE | OUTPATIENT
Start: 2024-12-02 | End: 2024-12-03

## 2024-12-02 RX ORDER — ONDANSETRON 2 MG/ML
4 INJECTION INTRAMUSCULAR; INTRAVENOUS
Status: DISCONTINUED | OUTPATIENT
Start: 2024-12-02 | End: 2024-12-02 | Stop reason: HOSPADM

## 2024-12-02 RX ORDER — HYDROMORPHONE HYDROCHLORIDE 1 MG/ML
0.4 INJECTION, SOLUTION INTRAMUSCULAR; INTRAVENOUS; SUBCUTANEOUS
Status: ACTIVE | OUTPATIENT
Start: 2024-12-02 | End: 2024-12-03

## 2024-12-02 RX ORDER — HYDRALAZINE HYDROCHLORIDE 20 MG/ML
5 INJECTION INTRAMUSCULAR; INTRAVENOUS
Status: DISCONTINUED | OUTPATIENT
Start: 2024-12-02 | End: 2024-12-02 | Stop reason: HOSPADM

## 2024-12-02 RX ORDER — HYDROMORPHONE HYDROCHLORIDE 1 MG/ML
0.5 INJECTION, SOLUTION INTRAMUSCULAR; INTRAVENOUS; SUBCUTANEOUS
Status: DISCONTINUED | OUTPATIENT
Start: 2024-12-02 | End: 2024-12-02 | Stop reason: HOSPADM

## 2024-12-02 RX ORDER — MORPHINE SULFATE 0.5 MG/ML
INJECTION, SOLUTION EPIDURAL; INTRATHECAL; INTRAVENOUS PRN
Status: DISCONTINUED | OUTPATIENT
Start: 2024-12-02 | End: 2024-12-02 | Stop reason: SURG

## 2024-12-02 RX ORDER — MEPERIDINE HYDROCHLORIDE 25 MG/ML
INJECTION INTRAMUSCULAR; INTRAVENOUS; SUBCUTANEOUS PRN
Status: DISCONTINUED | OUTPATIENT
Start: 2024-12-02 | End: 2024-12-02 | Stop reason: SURG

## 2024-12-02 RX ORDER — OXYCODONE HYDROCHLORIDE 5 MG/1
5 TABLET ORAL EVERY 4 HOURS PRN
Status: ACTIVE | OUTPATIENT
Start: 2024-12-02 | End: 2024-12-03

## 2024-12-02 RX ORDER — ONDANSETRON 4 MG/1
4 TABLET, ORALLY DISINTEGRATING ORAL EVERY 6 HOURS PRN
Status: DISCONTINUED | OUTPATIENT
Start: 2024-12-03 | End: 2024-12-04 | Stop reason: HOSPADM

## 2024-12-02 RX ORDER — SODIUM CHLORIDE, SODIUM LACTATE, POTASSIUM CHLORIDE, CALCIUM CHLORIDE 600; 310; 30; 20 MG/100ML; MG/100ML; MG/100ML; MG/100ML
INJECTION, SOLUTION INTRAVENOUS
Status: DISCONTINUED | OUTPATIENT
Start: 2024-12-02 | End: 2024-12-02 | Stop reason: SURG

## 2024-12-02 RX ORDER — BUPIVACAINE HYDROCHLORIDE 7.5 MG/ML
INJECTION, SOLUTION INTRASPINAL PRN
Status: DISCONTINUED | OUTPATIENT
Start: 2024-12-02 | End: 2024-12-02 | Stop reason: SURG

## 2024-12-02 RX ORDER — ONDANSETRON 2 MG/ML
INJECTION INTRAMUSCULAR; INTRAVENOUS PRN
Status: DISCONTINUED | OUTPATIENT
Start: 2024-12-02 | End: 2024-12-02 | Stop reason: SURG

## 2024-12-02 RX ORDER — EPHEDRINE SULFATE 50 MG/ML
10 INJECTION, SOLUTION INTRAVENOUS
Status: ACTIVE | OUTPATIENT
Start: 2024-12-02 | End: 2024-12-03

## 2024-12-02 RX ORDER — ONDANSETRON 2 MG/ML
4 INJECTION INTRAMUSCULAR; INTRAVENOUS EVERY 6 HOURS PRN
Status: ACTIVE | OUTPATIENT
Start: 2024-12-02 | End: 2024-12-03

## 2024-12-02 RX ORDER — DOCUSATE SODIUM 100 MG/1
100 CAPSULE, LIQUID FILLED ORAL 2 TIMES DAILY PRN
Status: DISCONTINUED | OUTPATIENT
Start: 2024-12-02 | End: 2024-12-04 | Stop reason: HOSPADM

## 2024-12-02 RX ADMIN — KETOROLAC TROMETHAMINE 15 MG: 15 INJECTION, SOLUTION INTRAMUSCULAR; INTRAVENOUS at 08:02

## 2024-12-02 RX ADMIN — CEFAZOLIN 2 G: 1 INJECTION, POWDER, FOR SOLUTION INTRAMUSCULAR; INTRAVENOUS at 07:28

## 2024-12-02 RX ADMIN — SCOPOLAMINE 1 PATCH: 1.5 PATCH, EXTENDED RELEASE TRANSDERMAL at 07:54

## 2024-12-02 RX ADMIN — HUMAN RHO(D) IMMUNE GLOBULIN 300 MCG: 1500 SOLUTION INTRAMUSCULAR; INTRAVENOUS at 18:17

## 2024-12-02 RX ADMIN — MEPERIDINE HYDROCHLORIDE 25 MG: 25 INJECTION INTRAMUSCULAR; INTRAVENOUS; SUBCUTANEOUS at 07:58

## 2024-12-02 RX ADMIN — SODIUM CHLORIDE, POTASSIUM CHLORIDE, SODIUM LACTATE AND CALCIUM CHLORIDE: 600; 310; 30; 20 INJECTION, SOLUTION INTRAVENOUS at 07:28

## 2024-12-02 RX ADMIN — SODIUM CITRATE AND CITRIC ACID MONOHYDRATE: 334; 500 SOLUTION ORAL at 07:15

## 2024-12-02 RX ADMIN — PHENYLEPHRINE HYDROCHLORIDE 10 MCG/MIN: 10 INJECTION INTRAVENOUS at 07:32

## 2024-12-02 RX ADMIN — ACETAMINOPHEN 1000 MG: 500 TABLET ORAL at 18:17

## 2024-12-02 RX ADMIN — ACETAMINOPHEN 1000 MG: 500 TABLET ORAL at 10:56

## 2024-12-02 RX ADMIN — PRENATAL WITH FERROUS FUM AND FOLIC ACID 1 TABLET: 3080; 920; 120; 400; 22; 1.84; 3; 20; 10; 1; 12; 200; 27; 25; 2 TABLET ORAL at 10:56

## 2024-12-02 RX ADMIN — MORPHINE SULFATE 0.15 MG: 0.5 INJECTION, SOLUTION EPIDURAL; INTRATHECAL; INTRAVENOUS at 07:34

## 2024-12-02 RX ADMIN — METOCLOPRAMIDE HYDROCHLORIDE 10 MG: 5 INJECTION INTRAMUSCULAR; INTRAVENOUS at 07:12

## 2024-12-02 RX ADMIN — OXYTOCIN 20 UNITS: 10 INJECTION, SOLUTION INTRAMUSCULAR; INTRAVENOUS at 07:52

## 2024-12-02 RX ADMIN — SODIUM CHLORIDE, POTASSIUM CHLORIDE, SODIUM LACTATE AND CALCIUM CHLORIDE 1000 ML: 600; 310; 30; 20 INJECTION, SOLUTION INTRAVENOUS at 07:00

## 2024-12-02 RX ADMIN — KETOROLAC TROMETHAMINE 15 MG: 15 INJECTION, SOLUTION INTRAMUSCULAR; INTRAVENOUS at 20:06

## 2024-12-02 RX ADMIN — FAMOTIDINE 20 MG: 10 INJECTION, SOLUTION INTRAVENOUS at 07:10

## 2024-12-02 RX ADMIN — ONDANSETRON 4 MG: 2 INJECTION INTRAMUSCULAR; INTRAVENOUS at 07:54

## 2024-12-02 RX ADMIN — Medication: at 07:15

## 2024-12-02 RX ADMIN — FENTANYL CITRATE 15 MCG: 50 INJECTION, SOLUTION INTRAMUSCULAR; INTRAVENOUS at 07:34

## 2024-12-02 RX ADMIN — DEXAMETHASONE SODIUM PHOSPHATE 8 MG: 4 INJECTION INTRA-ARTICULAR; INTRALESIONAL; INTRAMUSCULAR; INTRAVENOUS; SOFT TISSUE at 07:54

## 2024-12-02 RX ADMIN — BUPIVACAINE HYDROCHLORIDE IN DEXTROSE 1.7 ML: 7.5 INJECTION, SOLUTION SUBARACHNOID at 07:34

## 2024-12-02 RX ADMIN — KETOROLAC TROMETHAMINE 15 MG: 15 INJECTION, SOLUTION INTRAMUSCULAR; INTRAVENOUS at 13:31

## 2024-12-02 RX ADMIN — OXYTOCIN 125 ML/HR: 10 INJECTION, SOLUTION INTRAMUSCULAR; INTRAVENOUS at 09:13

## 2024-12-02 ASSESSMENT — LIFESTYLE VARIABLES
ON A TYPICAL DAY WHEN YOU DRINK ALCOHOL HOW MANY DRINKS DO YOU HAVE: 0
TOTAL SCORE: 0
TOTAL SCORE: 0
HAVE PEOPLE ANNOYED YOU BY CRITICIZING YOUR DRINKING: NO
EVER HAD A DRINK FIRST THING IN THE MORNING TO STEADY YOUR NERVES TO GET RID OF A HANGOVER: NO
ALCOHOL_USE: NO
AVERAGE NUMBER OF DAYS PER WEEK YOU HAVE A DRINK CONTAINING ALCOHOL: 0
CONSUMPTION TOTAL: NEGATIVE
HOW MANY TIMES IN THE PAST YEAR HAVE YOU HAD 5 OR MORE DRINKS IN A DAY: 0
HAVE YOU EVER FELT YOU SHOULD CUT DOWN ON YOUR DRINKING: NO
TOTAL SCORE: 0
EVER FELT BAD OR GUILTY ABOUT YOUR DRINKING: NO

## 2024-12-02 ASSESSMENT — PAIN DESCRIPTION - PAIN TYPE
TYPE: ACUTE PAIN

## 2024-12-02 ASSESSMENT — PATIENT HEALTH QUESTIONNAIRE - PHQ9
1. LITTLE INTEREST OR PLEASURE IN DOING THINGS: NOT AT ALL
SUM OF ALL RESPONSES TO PHQ9 QUESTIONS 1 AND 2: 0
2. FEELING DOWN, DEPRESSED, IRRITABLE, OR HOPELESS: NOT AT ALL

## 2024-12-02 ASSESSMENT — PAIN SCALES - GENERAL: PAIN_LEVEL: 0

## 2024-12-02 ASSESSMENT — SOCIAL DETERMINANTS OF HEALTH (SDOH)
WITHIN THE PAST 12 MONTHS, YOU WORRIED THAT YOUR FOOD WOULD RUN OUT BEFORE YOU GOT THE MONEY TO BUY MORE: NEVER TRUE
WITHIN THE LAST YEAR, HAVE YOU BEEN HUMILIATED OR EMOTIONALLY ABUSED IN OTHER WAYS BY YOUR PARTNER OR EX-PARTNER?: NO
IN THE PAST 12 MONTHS, HAS THE ELECTRIC, GAS, OIL, OR WATER COMPANY THREATENED TO SHUT OFF SERVICE IN YOUR HOME?: NO
WITHIN THE PAST 12 MONTHS, THE FOOD YOU BOUGHT JUST DIDN'T LAST AND YOU DIDN'T HAVE MONEY TO GET MORE: NEVER TRUE
WITHIN THE LAST YEAR, HAVE YOU BEEN AFRAID OF YOUR PARTNER OR EX-PARTNER?: NO
WITHIN THE LAST YEAR, HAVE YOU BEEN KICKED, HIT, SLAPPED, OR OTHERWISE PHYSICALLY HURT BY YOUR PARTNER OR EX-PARTNER?: NO
WITHIN THE LAST YEAR, HAVE TO BEEN RAPED OR FORCED TO HAVE ANY KIND OF SEXUAL ACTIVITY BY YOUR PARTNER OR EX-PARTNER?: NO

## 2024-12-02 ASSESSMENT — FIBROSIS 4 INDEX: FIB4 SCORE: 0.46

## 2024-12-02 NOTE — PROGRESS NOTES
0945- pt to floor. Oriented to room and policies. Bands and infant cuddles verified. POC discussed including feeding intervals, I+O documentation, latch support, lochia changes, ambulation, infant temperature management including STS and layering, weights, VS intervals, and discharge planning. Medications and other comfort measures discussed. Call light in reach. [place], SCDs, and  in place.     Infant brought to right breast and latch observed in football hold. Home nipple shield in use. MOB reports she used nipple shield with now 1 year old d/t nipple anatomy. Rhythmic suckling observed during sustained latch.

## 2024-12-02 NOTE — ANESTHESIA POSTPROCEDURE EVALUATION
Patient: Kathie Urban    Procedure Summary       Date: 24 Room / Location: [facility] OR 02 / SURGERY LABOR AND DELIVERY    Anesthesia Start: 728 Anesthesia Stop: 819    Procedure: REPEAT  SECTION Diagnosis: (HX OF  SECTION; laboring)    Surgeons: Vanesa Garcia M.D. Responsible Provider: Sean Rowe M.D.    Anesthesia Type: spinal ASA Status: 2            Final Anesthesia Type: spinal  Last vitals  BP   Blood Pressure: 121/71    Temp   36.4 °C (97.5 °F)    Pulse   92   Resp   20    SpO2   91 %      Anesthesia Post Evaluation    Patient location during evaluation: PACU  Patient participation: complete - patient participated  Level of consciousness: awake and alert  Pain score: 0    Airway patency: patent  Anesthetic complications: no  Cardiovascular status: hemodynamically stable  Respiratory status: acceptable  Hydration status: euvolemic    PONV: none          No notable events documented.

## 2024-12-02 NOTE — PROGRESS NOTES
0540- Patient arrived to triage for contractions that have been ongoing since 2200 24. Patient is a  with an PATRICIA of 24 making the patient 37.0 weeks pregnant. Patient denies LOF or VB and confirms good FM. External toco and FHM applied to patient. Vitals obtained. MFTI filed.    0555- Report given to Dr. Tripathi. Received orders to perform another SVE in an hour.     0645- Dr. Tripathi at bedside. Repeat  section called by provider.     0700- Report given to DARNELL Palmer.

## 2024-12-02 NOTE — OP REPORT
Operative procedure note    Procedure performed  1. Repeat        Preoperative diagnosis  1. IUP at term  2. Active labor  3. Previous CS, desires repeat    Postoperative diagnosis  Same    Surgeon: Vanesa Garcia M.D.  Assistant: MD Cholo    Anesthesiologist: MD Renny  Anesthesia: spinal    Estimated blood loss: 600 cc    male presentation vertex APGAR 8/9  Uterus Normal, Tubes Normal, ovaries Normal      Procedure in detail  After informed consent had been obtained the patient was seen to the operating room where anesthesia was found to be adequate.  The present was prepped and draped in the usual sterile fashion, a timeout was performed.  Pfannenstiel skin incision was made and carried down to the underlying layer of fascia with the knife.  The fascia was then nicked in the midline elevated and the fascial incision extended laterally on both sides with Rodríguez scissors.  The rectus muscles were then dissected off the posterior aspect of the fascia both superiorly and inferiorly.  They were  in the midline the peritoneum was identified and entered sharply with Metzenbaum scissors.  The peritoneal incision was then extended superiorly and inferiorly with good visualization of bowel and bladder.  The Alex O retractor was placed into the incision and checked to make sure there was no bowel entrapment.  The vesicular uterine peritoneum was identified and a bladder flap created.  The uterus was incised in a low transverse fashion and extended bluntly.  The infant was delivered through the incision without complication, after 30 seconds of delayed cord clamping the cord was clamped x2 and cut.  The infant was then handed to awaiting nursing staff.  The placenta then delivered spontaneously.  The uterus was cleared of all clots and debris with a moist lap sponge.  The uterine incision closed with a #1 chromic in a running locked fashion.  Hemostasis was found to be adequate at the hysterotomy.   The abdomen was irrigated cleared of all clots and debris with a moist lap sponge.  The Alex O retractor was removed.  The peritoneum was identified and loosely closed with 0 Vicryl.  .  The fascia was then closed with 0 Vicryl in a running fashion.  Subcutaneous layers were checked for hemostasis which was noted to be adequate reapproximated with a 2-0 plain.  The skin was closed in a subcuticular fashion with a 4-0 Vicryl.  Sponge lap needle counts were all correct x2 and the patient is taken to the recovery room in good condition.

## 2024-12-02 NOTE — PROGRESS NOTES
0700- Received report from DARNELL Huntley. Assumed care of pt. Pt prepped for repeat  section.    0751- Delivery of a viable male by Dr. Garcia. Apgars 8/9.    0924- Pt transferred to PP.    0943- Report to DARNELL Chapman

## 2024-12-02 NOTE — H&P
Department of Obstetrics and Gynecology  Labor and Delivery History and Physical    Date of Admission: 2024     ID: 29 y.o. \ with IUP at 37w0d, PATRICIA 2024.    Primary OB: Vanesa Garcia M.D.     Attending OB: Sujey Tripathi MD    CC: Contractions    HPI: Kathie Urban is a 29 y.o. -0-2-1 at 37 weeks and 0 days who presents with contractions that started last evening.  She denies loss of fluid.  She has had some bloody show.She had a primary  section for macrosomia.  She did not labor.  She desires a repeat  section    ROS: 10 systems reviewed and negative except as noted above.    Obstetric History   G1 - Etop  G2 - Etop  G3 -, primary  section for suspected macrosomia, female, 9 pounds 4 ounces, [facility]/Dr. Garcia  G4 - Current pregnancy        Past Medical History  Surgical History   None   Appendectomy   section      Gynecologic History  Social History   Regular menses prior to pregnancy  Denies Hx of abnormal pap smears.  Denies Hx of STIs Tobacco: Denies  EtOH: Denies  Street Drugs: Denies      Medications  Allergies   Prenatal vitamins   Patient has no known allergies.       O: There were no vitals taken for this visit.    Gen: NAD, AAO  Abd: Gravid, NTTP,Cephalic by Le[place], No rebound or guarding  Ext: NTTP, no edema, 2+DPP  Pelvic: SVE 4/70/-2    FHT: 140s with moderate long-term variability.  Accelerations present.  No variables.  No decelerations  Dana: CTX q 2 to 4 minutes    Labs:   pending    Prenatal labs:  Blood Type: A negative. (Rhogam given 10/3/2024)   AST: Negative  Rubella: Immune  HbSAg: Negative  HIV: Nonreactive  RPR: Nonreactive  Varicella: immune  GTT: 141, 3h GTT 83/188/131/85  GBS: positive    Genetic screening: Low risk NIPT XY.  Rh+  Carrier screening: Unknown  Ultrasound: Anterior, no previa, foramen ovale flap aneurysmal with bidirectional flow  Growth:   @ 36w: 6lb9oz, 57%, AC 71%     Vaccination History:    Covid: Unknown  TDap: Declined  Flu: Unknown  RSV: Has not had    A/P: Kathie Urban is a 29 y.o. -0-2-1 at 37 weeks and 0 days  History of prior  section, desires repeat  Active labor  -  *Admit to L&D  *IV, CBC, T&S on hold  *Options for delivery reviewed with her.  She desires a repeat  section.  Consent will be completed by Dr. Garcia.     Sujey Tripathi M.D.,  2024, 5:46 AM

## 2024-12-02 NOTE — ANESTHESIA PREPROCEDURE EVALUATION
Case: [id number] Date/Time: 24    Procedure: REPEAT  SECTION    Pre-op diagnosis: HX OF  SECTION; laboring    Location: [facility] OR 01 / SURGERY LABOR AND DELIVERY    Surgeons: Vanesa Garcia M.D.          [place] pregnancy at 37 weeks gestation. Hx of prior .    Relevant Problems   CARDIAC   (positive) Hemorrhoids       Physical Exam    Airway   Mallampati: II  TM distance: >3 FB  Neck ROM: full       Cardiovascular - normal exam  Rhythm: regular  Rate: normal  (-) murmur     Dental - normal exam           Pulmonary - normal exam  Breath sounds clear to auscultation     Abdominal    Neurological - normal exam                   Anesthesia Plan    ASA 2       Plan - spinal   Neuraxial block will be primary anesthetic                Postoperative Plan: Postoperative administration of opioids is intended.    Pertinent diagnostic labs and testing reviewed    Informed Consent:    Anesthetic plan and risks discussed with patient.

## 2024-12-02 NOTE — ANESTHESIA PROCEDURE NOTES
Spinal Block    Date/Time: 12/2/2024 7:28 AM    Performed by: Sean Rowe M.D.  Authorized by: Sean Rowe M.D.    Patient Location:  OR  Start Time:  12/2/2024 7:28 AM  End Time:  12/2/2024 7:34 AM  Reason for Block: primary anesthetic    patient identified, IV checked, site marked, risks and benefits discussed, surgical consent, monitors and equipment checked, pre-op evaluation and timeout performed    Patient Position:  Sitting  Prep: ChloraPrep, patient draped and sterile technique    Monitoring:  Blood pressure, continuous pulse oximetry and heart rate  Approach:  Midline  Location:  L3-4  Injection Technique:  Single-shot  Skin infiltration:  Lidocaine  Strength:  1%  Dose:  3ml  Needle Type:  Pencan  Needle Gauge:  25 G  CSF flowing pre/post injection:  Yes  Sensory Level:  T4   Success on 1st pass  No heme  Normal resistance on injection  CSF flowing pre and post injection  No evidence of complications

## 2024-12-02 NOTE — LACTATION NOTE
This note was copied from a baby's chart.  I met with Kathie, mother of baby Arnoldo this afternoon to offer breast feeding and lactation support. Kathie had Arnoldo latched to her left breast using a nipple shield when I arrived. She was collecting colostrum on her right with a Haaka device. She is quite comfortable with her plan.    Her history includes breastfeeding her first child for 2 years using a nipple shield the entire time. That baby had a G-button for her first year. Arnoldo was latched without the aid of the shield initially but as her nipples began to feel pinched, she opted to introduce the shield for him.     I assured her we are available daily to assist and offer guidance and I plan to see her tomorrow morning.

## 2024-12-02 NOTE — ANESTHESIA TIME REPORT
Anesthesia Start and Stop Event Times       Date Time Event    12/2/2024 0703 Ready for Procedure     0728 Anesthesia Start     0819 Anesthesia Stop          Responsible Staff  12/02/24      Name Role Begin End    Sean Rowe M.D. Anesth 0728 0819          Overtime Reason:  no overtime (within assigned shift)    Comments:

## 2024-12-03 PROCEDURE — A9270 NON-COVERED ITEM OR SERVICE: HCPCS | Performed by: ANESTHESIOLOGY

## 2024-12-03 PROCEDURE — 700102 HCHG RX REV CODE 250 W/ 637 OVERRIDE(OP): Performed by: ANESTHESIOLOGY

## 2024-12-03 PROCEDURE — A9270 NON-COVERED ITEM OR SERVICE: HCPCS | Performed by: OBSTETRICS & GYNECOLOGY

## 2024-12-03 PROCEDURE — 700111 HCHG RX REV CODE 636 W/ 250 OVERRIDE (IP): Mod: JZ | Performed by: ANESTHESIOLOGY

## 2024-12-03 PROCEDURE — 770002 HCHG ROOM/CARE - OB PRIVATE (112)

## 2024-12-03 PROCEDURE — 700102 HCHG RX REV CODE 250 W/ 637 OVERRIDE(OP): Performed by: OBSTETRICS & GYNECOLOGY

## 2024-12-03 RX ADMIN — IBUPROFEN 800 MG: 800 TABLET, FILM COATED ORAL at 15:46

## 2024-12-03 RX ADMIN — ACETAMINOPHEN 1000 MG: 500 TABLET ORAL at 12:54

## 2024-12-03 RX ADMIN — ACETAMINOPHEN 1000 MG: 500 TABLET ORAL at 01:56

## 2024-12-03 RX ADMIN — KETOROLAC TROMETHAMINE 15 MG: 15 INJECTION, SOLUTION INTRAMUSCULAR; INTRAVENOUS at 08:30

## 2024-12-03 RX ADMIN — OXYCODONE 5 MG: 5 TABLET ORAL at 20:34

## 2024-12-03 RX ADMIN — KETOROLAC TROMETHAMINE 15 MG: 15 INJECTION, SOLUTION INTRAMUSCULAR; INTRAVENOUS at 01:56

## 2024-12-03 RX ADMIN — ACETAMINOPHEN 1000 MG: 500 TABLET ORAL at 18:49

## 2024-12-03 RX ADMIN — SIMETHICONE 125 MG: 125 TABLET, CHEWABLE ORAL at 08:41

## 2024-12-03 RX ADMIN — OXYCODONE 5 MG: 5 TABLET ORAL at 15:47

## 2024-12-03 ASSESSMENT — PAIN DESCRIPTION - PAIN TYPE
TYPE: ACUTE PAIN;SURGICAL PAIN
TYPE: SURGICAL PAIN
TYPE: ACUTE PAIN
TYPE: ACUTE PAIN;SURGICAL PAIN

## 2024-12-03 NOTE — DISCHARGE PLANNING
Discharge Planning Assessment Post Partum    Completed chart review. Met with parents of infant at PP bedside    Reason for Referral: History of depression  Address: [id number] [place] [address]  Type of Living Situation:stable  Mom Diagnosis: post partum  Baby Diagnosis:   Primary Language: english    Name of Baby: Arnoldo Ascencio  Father of the Baby: Sancho Ascencio  Involved in baby’s care? yes  Contact Information: [phone]    Prenatal Care: Dr. Garcia  Mom's PCP: none  PCP for new baby:Fiordaliza Cunningham    Support System: family  Coping/Bonding between mother & baby: appropriate  Source of Feeding: breast  Supplies for Infant: prepared    Mom's Insurance: Jusp   Baby Covered on Insurance: yes  Mother Employed/School: [employer]  Other children in the home/names & ages: 3 year old Elas Ascencio    Financial Hardship/Income: denies   Mom's Mental status: alert and oriented  Services used prior to admit: none    CPS History: denies  Psychiatric History: denies - discussed  mental health and provided resources  Domestic Violence History: denies   Drug/ETOH History: denies    Resources Provided: PP support. Parents deniy need for any other resources  Referrals Made: none needed      Clearance for Discharge: infant to discharge home when medically ready.

## 2024-12-03 NOTE — CARE PLAN
The patient is Stable - Low risk of patient condition declining or worsening    Shift Goals  Clinical Goals: VSS, light lochia  Patient Goals: pain control, rest  Family Goals: support    Progress made toward(s) clinical / shift goals:    Problem: Knowledge Deficit - Postpartum  Goal: Patient will verbalize and demonstrate understanding of self and infant care  Outcome: Progressing     Problem: Early Mobilization - Post Surgery  Goal: Early mobilization post surgery  Outcome: Progressing       Patient is not progressing towards the following goals:

## 2024-12-03 NOTE — PROGRESS NOTES
Progress Note    Kathie Ana Urban   Post-op day 1  Chief Admitting Dx: Labor and delivery indication for care or intervention [O75.9]  Delivery Type:  for repeat      Subjective:  Ambulating: yes  Tolerating oral feed: yes  Flatus: yes    Voiding: yes  Dizziness: no  Vitals:    24 2300 24 2350 24 0210 24 0538   BP:  107/69 110/65 116/71   Pulse: 66 75 78 75   Resp: 18 17 18 16   Temp:  36.1 °C (96.9 °F) 36.6 °C (97.9 °F) 36.2 °C (97.1 °F)   TempSrc:  Temporal Temporal Temporal   SpO2: 95% 96% 96% 96%   Weight:       Height:           Exam:  Abdomen: Abdomen soft, non-tender. BS normal. No masses,  No organomegaly  Incision: dry and intact  Fundus Tenderness: yes  Below umbilicus: yes  Perineum: perineum intact  Extremities: Normal  Lochia: mild    Labs:   Recent Results (from the past 24 hours)   Hold Blood Bank Specimen (Not Tested)    Collection Time: 24  7:04 AM   Result Value Ref Range    Holding Tube - Bb DONE    CBC with Differential    Collection Time: 24  7:04 AM   Result Value Ref Range    WBC 12.3 (H) 4.8 - 10.8 K/uL    RBC 4.39 4.20 - 5.40 M/uL    Hemoglobin 11.7 (L) 12.0 - 16.0 g/dL    Hematocrit 34.7 (L) 37.0 - 47.0 %    MCV 79.0 (L) 81.4 - 97.8 fL    MCH 26.7 (L) 27.0 - 33.0 pg    MCHC 33.7 32.2 - 35.5 g/dL    RDW 40.1 35.9 - 50.0 fL    Platelet Count 222 164 - 446 K/uL    MPV 9.7 9.0 - 12.9 fL    Neutrophils-Polys 78.70 (H) 44.00 - 72.00 %    Lymphocytes 13.80 (L) 22.00 - 41.00 %    Monocytes 4.10 0.00 - 13.40 %    Eosinophils 2.10 0.00 - 6.90 %    Basophils 0.40 0.00 - 1.80 %    Immature Granulocytes 0.90 0.00 - 0.90 %    Nucleated RBC 0.00 0.00 - 0.20 /100 WBC    Neutrophils (Absolute) 9.66 (H) 1.82 - 7.42 K/uL    Lymphs (Absolute) 1.69 1.00 - 4.80 K/uL    Monos (Absolute) 0.50 0.00 - 0.85 K/uL    Eos (Absolute) 0.26 0.00 - 0.51 K/uL    Baso (Absolute) 0.05 0.00 - 0.12 K/uL    Immature Granulocytes (abs) 0.11 0.00 - 0.11 K/uL    NRBC (Absolute) 0.00  K/uL   T.PALLIDUM AB BETHANY (Syphilis)    Collection Time: 12/02/24  7:04 AM   Result Value Ref Range    Syphilis, Treponemal Qual Nonreactive Non-Reactive   RH TYPE (ONLY)    Collection Time: 12/02/24  7:04 AM   Result Value Ref Range    Rh Grouping Only NEG    CBC without differential- Once in 8 hours post delivery    Collection Time: 12/02/24  4:29 PM   Result Value Ref Range    WBC 15.7 (H) 4.8 - 10.8 K/uL    RBC 4.21 4.20 - 5.40 M/uL    Hemoglobin 10.9 (L) 12.0 - 16.0 g/dL    Hematocrit 33.3 (L) 37.0 - 47.0 %    MCV 79.1 (L) 81.4 - 97.8 fL    MCH 25.9 (L) 27.0 - 33.0 pg    MCHC 32.7 32.2 - 35.5 g/dL    RDW 39.3 35.9 - 50.0 fL    Platelet Count 224 164 - 446 K/uL    MPV 9.3 9.0 - 12.9 fL   MOM RHHDN/RHOGAM    Collection Time: 12/02/24  4:29 PM   Result Value Ref Range    Immune Rosetting Test NEG     Number Of Rh Doses Indicated 1        Assessment:  Continue Routine postpartum care      Plan:  Advance Diet and Encourange Ambulation    Vanesa Garcia M.D.

## 2024-12-03 NOTE — CARE PLAN
The patient is Stable - Low risk of patient condition declining or worsening    Shift Goals  Clinical Goals: lochia wnl, rest, pain control, bond with infant  Patient Goals: pain control, bond with infant  Family Goals: support    Progress made toward(s) clinical / shift goals:    Problem: Pain - Standard  Goal: Alleviation of pain or a reduction in pain to the patient’s comfort goal  Outcome: Progressing     Problem: Knowledge Deficit - Postpartum  Goal: Patient will verbalize and demonstrate understanding of self and infant care  Outcome: Progressing     Problem: Early Mobilization - Post Surgery  Goal: Early mobilization post surgery  Outcome: Progressing       Patient is not progressing towards the following goals:

## 2024-12-03 NOTE — PROGRESS NOTES
2015: Removed pt's loza catheter per MD order. Deflated 10 mL balloon, catheter tip intact. Educated pt on importance of voiding within 6 hours, pt verbalized understanding. Encouraged PO intake.

## 2024-12-03 NOTE — CARE PLAN
The patient is Stable - Low risk of patient condition declining or worsening    Shift Goals  Clinical Goals: lochia WDL  Patient Goals: pain WDL  Family Goals: bond    Progress made toward(s) clinical / shift goals:    Problem: Altered Physiologic Condition  Goal: Patient physiologically stable as evidenced by normal lochia, palpable uterine involution and vitals within normal limits  Outcome: Progressing     Problem: Early Mobilization - Post Surgery  Goal: Early mobilization post surgery  Outcome: Progressing       Patient is not progressing towards the following goals:

## 2024-12-04 ENCOUNTER — PHARMACY VISIT (OUTPATIENT)
Dept: PHARMACY | Facility: MEDICAL CENTER | Age: 29
End: 2024-12-04
Payer: COMMERCIAL

## 2024-12-04 VITALS
HEART RATE: 77 BPM | SYSTOLIC BLOOD PRESSURE: 119 MMHG | BODY MASS INDEX: 35.31 KG/M2 | RESPIRATION RATE: 18 BRPM | OXYGEN SATURATION: 97 % | DIASTOLIC BLOOD PRESSURE: 70 MMHG | WEIGHT: 225 LBS | TEMPERATURE: 97.3 F | HEIGHT: 67 IN

## 2024-12-04 PROBLEM — G89.18 POSTOPERATIVE PAIN: Status: ACTIVE | Noted: 2024-12-04

## 2024-12-04 PROCEDURE — RXMED WILLOW AMBULATORY MEDICATION CHARGE: Performed by: OBSTETRICS & GYNECOLOGY

## 2024-12-04 PROCEDURE — A9270 NON-COVERED ITEM OR SERVICE: HCPCS | Performed by: OBSTETRICS & GYNECOLOGY

## 2024-12-04 PROCEDURE — 700102 HCHG RX REV CODE 250 W/ 637 OVERRIDE(OP): Performed by: OBSTETRICS & GYNECOLOGY

## 2024-12-04 RX ORDER — OXYCODONE HYDROCHLORIDE 5 MG/1
5 TABLET ORAL EVERY 6 HOURS PRN
Qty: 12 TABLET | Refills: 0 | Status: SHIPPED | OUTPATIENT
Start: 2024-12-04 | End: 2024-12-07

## 2024-12-04 RX ORDER — IBUPROFEN 800 MG/1
TABLET, FILM COATED ORAL
Qty: 30 TABLET | Refills: 0 | Status: SHIPPED | OUTPATIENT
Start: 2024-12-04 | End: 2025-01-01

## 2024-12-04 RX ADMIN — PRENATAL WITH FERROUS FUM AND FOLIC ACID 1 TABLET: 3080; 920; 120; 400; 22; 1.84; 3; 20; 10; 1; 12; 200; 27; 25; 2 TABLET ORAL at 09:00

## 2024-12-04 RX ADMIN — ACETAMINOPHEN 1000 MG: 500 TABLET ORAL at 00:26

## 2024-12-04 RX ADMIN — IBUPROFEN 800 MG: 800 TABLET, FILM COATED ORAL at 04:10

## 2024-12-04 RX ADMIN — ACETAMINOPHEN 1000 MG: 500 TABLET ORAL at 11:33

## 2024-12-04 RX ADMIN — SIMETHICONE 125 MG: 125 TABLET, CHEWABLE ORAL at 04:10

## 2024-12-04 RX ADMIN — OXYCODONE 5 MG: 5 TABLET ORAL at 05:11

## 2024-12-04 RX ADMIN — ACETAMINOPHEN 1000 MG: 500 TABLET ORAL at 05:11

## 2024-12-04 ASSESSMENT — PAIN DESCRIPTION - PAIN TYPE
TYPE: ACUTE PAIN
TYPE: ACUTE PAIN;SURGICAL PAIN
TYPE: SURGICAL PAIN
TYPE: ACUTE PAIN;SURGICAL PAIN
TYPE: ACUTE PAIN
TYPE: SURGICAL PAIN
TYPE: ACUTE PAIN;SURGICAL PAIN
TYPE: SURGICAL PAIN

## 2024-12-04 ASSESSMENT — EDINBURGH POSTNATAL DEPRESSION SCALE (EPDS)
I HAVE BLAMED MYSELF UNNECESSARILY WHEN THINGS WENT WRONG: YES, SOME OF THE TIME
THINGS HAVE BEEN GETTING ON TOP OF ME: NO, MOST OF THE TIME I HAVE COPED QUITE WELL
I HAVE BEEN ABLE TO LAUGH AND SEE THE FUNNY SIDE OF THINGS: AS MUCH AS I ALWAYS COULD
I HAVE BEEN ANXIOUS OR WORRIED FOR NO GOOD REASON: HARDLY EVER
I HAVE LOOKED FORWARD WITH ENJOYMENT TO THINGS: AS MUCH AS I EVER DID
I HAVE BEEN SO UNHAPPY THAT I HAVE BEEN CRYING: ONLY OCCASIONALLY
I HAVE FELT SAD OR MISERABLE: NO, NOT AT ALL
THE THOUGHT OF HARMING MYSELF HAS OCCURRED TO ME: NEVER
I HAVE FELT SCARED OR PANICKY FOR NO GOOD REASON: NO, NOT MUCH
I HAVE BEEN SO UNHAPPY THAT I HAVE HAD DIFFICULTY SLEEPING: NOT AT ALL

## 2024-12-04 NOTE — CARE PLAN
The patient is Stable - Low risk of patient condition declining or worsening    Shift Goals  Clinical Goals: VSS, light lochia  Patient Goals: pain control, rest  Family Goals: support    Progress made toward(s) clinical / shift goals:    Problem: Knowledge Deficit - L&D  Goal: Patient and family/caregivers will demonstrate understanding of plan of care, disease process/condition, diagnostic tests and medications  Description: Target End Date:  1-3 days or as soon as patient condition allows    1.  Oriented to unit, equipment, visitation policy and means for communicating concern  2.  Complete/review Learning Assessment  3.  Assess patient's preparation, knowledge level and expectations  4.  Provide accurate information in basic terms, clarify misconceptions  5.  Explain disease process/condition, treatment plan, diagnostic tests and medications  6.  Encourage patient and support person to ask questions and verbalize their understanding  7.  Instruct patient/support person in basic interpretation of fetal monitor  8.  Obtain informed consent when appropriate  9.  Demonstrate breathing/relaxation techniques  Outcome: Progressing     Problem: Risk for Excess Fluid Volume  Goal: Patient will demonstrate pulse, blood pressure and neurologic signs within expected ranges and without any respiratory complications  Description: 1.  Monitor for signs of hypertension and tachycardia; observe for signs of dyspnea; auscultate for signs of stridor, rhonchi or moist crackles  2.  Monitor for the intake/output.  Check the infusion rate of the fluids manually or preferably through the use of infusion pumps  3.  Monitor hemoglobin/hematocrit and platelets   Outcome: Progressing     Problem: Pain - Standard  Goal: Alleviation of pain or a reduction in pain to the patient’s comfort goal  Description: Target End Date:  Prior to discharge or change in level of care    Document on Vitals flowsheet    1.  Document pain using the  appropriate pain scale per order or unit policy  2.  Educate and implement non-pharmacologic comfort measures (i.e. relaxation, distraction, massage, cold/heat therapy, etc.)  3.  Pain management medications as ordered  4.  Reassess pain after pain med administration per policy  5.  If opiods administered assess patient's response to pain medication is appropriate per POSS sedation scale  6.  Follow pain management plan developed in collaboration with patient and interdisciplinary team (including palliative care or pain specialists if applicable)  Outcome: Progressing     Problem: Knowledge Deficit - Standard  Goal: Patient and family/care givers will demonstrate understanding of plan of care, disease process/condition, diagnostic tests and medications  Description: Target End Date:  1-3 days or as soon as patient condition allows    Document in Patient Education    1.  Patient and family/caregiver oriented to unit, equipment, visitation policy and means for communicating concern  2.  Complete/review Learning Assessment  3.  Assess knowledge level of disease process/condition, treatment plan, diagnostic tests and medications  4.  Explain disease process/condition, treatment plan, diagnostic tests and medications  Outcome: Progressing     Problem: Knowledge Deficit - Postpartum  Goal: Patient will verbalize and demonstrate understanding of self and infant care  Description: Target End Date:  1-3 days or as soon as patient condition allows    Document in Patient Education    1.  Assess patient and knowledge of self and infant care  2.  Educate patient verbally, by demonstration and written material  Outcome: Progressing     Problem: Psychosocial - Postpartum  Goal: Patient will verbalize and demonstrate effective bonding and parenting behavior  Description: Target End Date:  1 to 4 days    1.  Assess patient for anxiety or apprehension regarding parenting role  2.  Provide emotional support and encouragement to  patient/family/caregiver  Outcome: Progressing     Problem: Altered Physiologic Condition  Goal: Patient physiologically stable as evidenced by normal lochia, palpable uterine involution and vitals within normal limits  Description: Target End Date:  1 to 4 days    Document on Assessment flowsheet    1.  Perform physical assessment and obtain vitals per intrapartum/postpartum standards of care  2.  Follow epidural/spinal narcotic protocol if patient has received a long acting narcotic  3.  Massage fundus as necessary to prevent excessive lochia  4.  Administer pitocin, methergine, cytotec or hemabate as ordered  Outcome: Progressing     Problem: Infection - Postpartum  Goal: Postpartum patient will be free of signs and symptoms of infection  Description: Target End Date:  Target End Date:  1 to 4 days  1.  Instruct patient in pericare/incisional care  2.  Give antibiotics as indicated an ordered  3.  Get patient out of bed and ambulating as ordered  Outcome: Progressing     Problem: Respiratory/Oxygenation Function Post-Surgical  Goal: Patient will achieve/maintain normal respiratory rate/effort  Description: Target End Date:  Prior to discharge or change in level of care    Document on Assessment flowsheet    1.   Assess and monitor rate, rhythm, depth and effort of respiration  2.   Ensure continuous pulse oximetry in use  3.   Assess O2 saturation, administer/titrate oxygen as ordered  4.   Educate patient on importance of turning, coughing and deep breathing  5.   Educate patient on splinting techniques during deep breathing and coughing  6.   Encourage use of incentive spirometer (at least 5 to 10 times per hour)  7.   Position patient for maximum ventilatory efficiency  8.   Collaborate with RT to administer medication/treatments per order  9.   Airway suctioning as needed  10. Oral hygiene  11. Alternate physical activity with rest periods  Outcome: Progressing     Problem: Bowel Elimination - Post  Surgical  Goal: Patient will resume regular bowel sounds and function with no discomfort or distention  Description: Target End Date:  1 - 3 days post op    1.  Monitor bowel sounds every shift  2.  Assess for flatus  3.  Monitor for abdominal distention  4.  Monitor for abdominal discomfort  5.  Initiate bowel protocol on post op day 2 if no bowel movement  6.  Note date of last BM  7.  Educate about diet, fluid intake, medication and activity to promote bowel function  8.  Educate signs and symptoms of constipation and interventions to implement  9.  Pharmacologic bowel management per provider order  10. Regular toileting schedule  11. Upright position for toileting  12. High fiber diet  13. Encourage hydration  14. Collaborate with Clinical Dietician  15. Care and maintenance of ostomy if applicable  Outcome: Progressing     Problem: Early Mobilization - Post Surgery  Goal: Early mobilization post surgery  Description: Target End Date:  Post op day 1    1.  Out of bed on post op day 0, unless contraindicated  2.  Ambulate three times a day post-op day one, advance activity as tolerated  3.  Up in chair for meals  4.  Pain management adequate to allow progressive mobilization  5.  Don/doff brace/corset as ordered  Outcome: Progressing       Patient is not progressing towards the following goals:

## 2024-12-04 NOTE — PROGRESS NOTES
3742  Received report from DARNELL Cano at change of shift. Patient assessed and POC discussed. Patient is resting in bed. Fundus firm, lochia light.  Patient denies any needs at this time. Call light within reach, bed in lowest position. Patient is encouraged to call for pain/med interventions and any other needs.    1450   Discharge instructions reviewed. Verbalized understanding. Documents signed    1510   Left facility. Escorted by staff

## 2024-12-04 NOTE — DISCHARGE INSTRUCTIONS

## 2024-12-04 NOTE — LACTATION NOTE
Kathie is continuing to breast feed baby Blaze using the nipple shield for comfort. She is collecting extra colostrum in her [place], which appears to be about 15 ml. She has no concerns and was receptive to follow up lactation services. Handout provided and attendance at support circles encouraged.

## 2024-12-09 ENCOUNTER — HOSPITAL ENCOUNTER (EMERGENCY)
Facility: MEDICAL CENTER | Age: 29
End: 2024-12-10
Attending: OBSTETRICS & GYNECOLOGY | Admitting: OBSTETRICS & GYNECOLOGY
Payer: COMMERCIAL

## 2024-12-09 PROCEDURE — 84156 ASSAY OF PROTEIN URINE: CPT

## 2024-12-09 PROCEDURE — 81002 URINALYSIS NONAUTO W/O SCOPE: CPT

## 2024-12-09 PROCEDURE — 302449 STATCHG TRIAGE ONLY (STATISTIC)

## 2024-12-09 PROCEDURE — 82570 ASSAY OF URINE CREATININE: CPT

## 2024-12-09 ASSESSMENT — PAIN DESCRIPTION - PAIN TYPE: TYPE: ACUTE PAIN

## 2024-12-09 ASSESSMENT — FIBROSIS 4 INDEX: FIB4 SCORE: 0.66

## 2024-12-10 ENCOUNTER — ANESTHESIA EVENT (OUTPATIENT)
Dept: SURGERY | Facility: MEDICAL CENTER | Age: 29
End: 2024-12-10
Payer: COMMERCIAL

## 2024-12-10 ENCOUNTER — ANESTHESIA (OUTPATIENT)
Dept: SURGERY | Facility: MEDICAL CENTER | Age: 29
End: 2024-12-10
Payer: COMMERCIAL

## 2024-12-10 ENCOUNTER — APPOINTMENT (OUTPATIENT)
Dept: RADIOLOGY | Facility: MEDICAL CENTER | Age: 29
End: 2024-12-10
Attending: OBSTETRICS & GYNECOLOGY
Payer: COMMERCIAL

## 2024-12-10 ENCOUNTER — HOSPITAL ENCOUNTER (OUTPATIENT)
Facility: MEDICAL CENTER | Age: 29
End: 2024-12-10
Attending: STUDENT IN AN ORGANIZED HEALTH CARE EDUCATION/TRAINING PROGRAM | Admitting: STUDENT IN AN ORGANIZED HEALTH CARE EDUCATION/TRAINING PROGRAM
Payer: COMMERCIAL

## 2024-12-10 VITALS
SYSTOLIC BLOOD PRESSURE: 132 MMHG | RESPIRATION RATE: 18 BRPM | BODY MASS INDEX: 32.96 KG/M2 | HEIGHT: 67 IN | TEMPERATURE: 97 F | DIASTOLIC BLOOD PRESSURE: 79 MMHG | WEIGHT: 210 LBS | HEART RATE: 74 BPM

## 2024-12-10 VITALS
WEIGHT: 220 LBS | BODY MASS INDEX: 34.53 KG/M2 | HEART RATE: 92 BPM | DIASTOLIC BLOOD PRESSURE: 75 MMHG | RESPIRATION RATE: 16 BRPM | HEIGHT: 67 IN | SYSTOLIC BLOOD PRESSURE: 128 MMHG | TEMPERATURE: 96.6 F | OXYGEN SATURATION: 93 %

## 2024-12-10 DIAGNOSIS — K81.0 ACUTE CHOLECYSTITIS: ICD-10-CM

## 2024-12-10 DIAGNOSIS — R10.11 RIGHT UPPER QUADRANT ABDOMINAL PAIN: ICD-10-CM

## 2024-12-10 LAB
ALBUMIN SERPL BCP-MCNC: 4.1 G/DL (ref 3.2–4.9)
ALBUMIN/GLOB SERPL: 1.3 G/DL
ALP SERPL-CCNC: 97 U/L (ref 30–99)
ALT SERPL-CCNC: 22 U/L (ref 2–50)
ANION GAP SERPL CALC-SCNC: 13 MMOL/L (ref 7–16)
APPEARANCE UR: CLEAR
AST SERPL-CCNC: 17 U/L (ref 12–45)
BASOPHILS # BLD AUTO: 0.4 % (ref 0–1.8)
BASOPHILS # BLD: 0.05 K/UL (ref 0–0.12)
BILIRUB SERPL-MCNC: 0.3 MG/DL (ref 0.1–1.5)
BILIRUB UR QL STRIP.AUTO: NEGATIVE
BUN SERPL-MCNC: 8 MG/DL (ref 8–22)
CALCIUM ALBUM COR SERPL-MCNC: 8.9 MG/DL (ref 8.5–10.5)
CALCIUM SERPL-MCNC: 9 MG/DL (ref 8.5–10.5)
CHLORIDE SERPL-SCNC: 105 MMOL/L (ref 96–112)
CO2 SERPL-SCNC: 20 MMOL/L (ref 20–33)
COLOR UR AUTO: YELLOW
CREAT SERPL-MCNC: 0.4 MG/DL (ref 0.5–1.4)
CREAT UR-MCNC: 69.47 MG/DL
EOSINOPHIL # BLD AUTO: 0.31 K/UL (ref 0–0.51)
EOSINOPHIL NFR BLD: 2.8 % (ref 0–6.9)
ERYTHROCYTE [DISTWIDTH] IN BLOOD BY AUTOMATED COUNT: 40.2 FL (ref 35.9–50)
GFR SERPLBLD CREATININE-BSD FMLA CKD-EPI: 135 ML/MIN/1.73 M 2
GLOBULIN SER CALC-MCNC: 3.1 G/DL (ref 1.9–3.5)
GLUCOSE SERPL-MCNC: 106 MG/DL (ref 65–99)
GLUCOSE UR QL STRIP.AUTO: NEGATIVE MG/DL
HCT VFR BLD AUTO: 42.3 % (ref 37–47)
HGB BLD-MCNC: 13.1 G/DL (ref 12–16)
HOLDING TUBE BB 8507: NORMAL
IMM GRANULOCYTES # BLD AUTO: 0.06 K/UL (ref 0–0.11)
IMM GRANULOCYTES NFR BLD AUTO: 0.5 % (ref 0–0.9)
KETONES UR QL STRIP.AUTO: NEGATIVE MG/DL
LEUKOCYTE ESTERASE UR QL STRIP.AUTO: ABNORMAL
LIPASE SERPL-CCNC: 28 U/L (ref 11–82)
LYMPHOCYTES # BLD AUTO: 1.67 K/UL (ref 1–4.8)
LYMPHOCYTES NFR BLD: 14.8 % (ref 22–41)
MCH RBC QN AUTO: 24.8 PG (ref 27–33)
MCHC RBC AUTO-ENTMCNC: 31 G/DL (ref 32.2–35.5)
MCV RBC AUTO: 80.1 FL (ref 81.4–97.8)
MONOCYTES # BLD AUTO: 0.42 K/UL (ref 0–0.85)
MONOCYTES NFR BLD AUTO: 3.7 % (ref 0–13.4)
NEUTROPHILS # BLD AUTO: 8.74 K/UL (ref 1.82–7.42)
NEUTROPHILS NFR BLD: 77.8 % (ref 44–72)
NITRITE UR QL STRIP.AUTO: NEGATIVE
NRBC # BLD AUTO: 0 K/UL
NRBC BLD-RTO: 0 /100 WBC (ref 0–0.2)
PATHOLOGY CONSULT NOTE: NORMAL
PH UR STRIP.AUTO: 6.5 [PH] (ref 5–8)
PLATELET # BLD AUTO: 382 K/UL (ref 164–446)
PMV BLD AUTO: 9 FL (ref 9–12.9)
POTASSIUM SERPL-SCNC: 3.5 MMOL/L (ref 3.6–5.5)
PROT SERPL-MCNC: 7.2 G/DL (ref 6–8.2)
PROT UR QL STRIP: NEGATIVE MG/DL
PROT UR-MCNC: 10 MG/DL (ref 0–15)
PROT/CREAT UR: 144 MG/G (ref 10–107)
RBC # BLD AUTO: 5.28 M/UL (ref 4.2–5.4)
RBC UR QL AUTO: ABNORMAL
SODIUM SERPL-SCNC: 138 MMOL/L (ref 135–145)
SP GR UR STRIP.AUTO: >=1.03 (ref 1–1.03)
UROBILINOGEN UR STRIP.AUTO-MCNC: 0.2 MG/DL
WBC # BLD AUTO: 11.3 K/UL (ref 4.8–10.8)

## 2024-12-10 PROCEDURE — 700111 HCHG RX REV CODE 636 W/ 250 OVERRIDE (IP): Mod: JZ | Performed by: ANESTHESIOLOGY

## 2024-12-10 PROCEDURE — 700105 HCHG RX REV CODE 258: Performed by: ANESTHESIOLOGY

## 2024-12-10 PROCEDURE — 160041 HCHG SURGERY MINUTES - EA ADDL 1 MIN LEVEL 4: Performed by: SURGERY

## 2024-12-10 PROCEDURE — 160029 HCHG SURGERY MINUTES - 1ST 30 MINS LEVEL 4: Performed by: SURGERY

## 2024-12-10 PROCEDURE — 160046 HCHG PACU - 1ST 60 MINS PHASE II: Performed by: SURGERY

## 2024-12-10 PROCEDURE — 88304 TISSUE EXAM BY PATHOLOGIST: CPT

## 2024-12-10 PROCEDURE — 47562 LAPAROSCOPIC CHOLECYSTECTOMY: CPT | Mod: AS | Performed by: REGISTERED NURSE

## 2024-12-10 PROCEDURE — 99222 1ST HOSP IP/OBS MODERATE 55: CPT | Mod: 57 | Performed by: STUDENT IN AN ORGANIZED HEALTH CARE EDUCATION/TRAINING PROGRAM

## 2024-12-10 PROCEDURE — 160048 HCHG OR STATISTICAL LEVEL 1-5: Performed by: SURGERY

## 2024-12-10 PROCEDURE — 700102 HCHG RX REV CODE 250 W/ 637 OVERRIDE(OP): Performed by: ANESTHESIOLOGY

## 2024-12-10 PROCEDURE — 80053 COMPREHEN METABOLIC PANEL: CPT

## 2024-12-10 PROCEDURE — 96365 THER/PROPH/DIAG IV INF INIT: CPT

## 2024-12-10 PROCEDURE — G0378 HOSPITAL OBSERVATION PER HR: HCPCS

## 2024-12-10 PROCEDURE — 160002 HCHG RECOVERY MINUTES (STAT): Performed by: SURGERY

## 2024-12-10 PROCEDURE — A9270 NON-COVERED ITEM OR SERVICE: HCPCS | Performed by: ANESTHESIOLOGY

## 2024-12-10 PROCEDURE — 160036 HCHG PACU - EA ADDL 30 MINS PHASE I: Performed by: SURGERY

## 2024-12-10 PROCEDURE — 160025 RECOVERY II MINUTES (STATS): Performed by: SURGERY

## 2024-12-10 PROCEDURE — 160035 HCHG PACU - 1ST 60 MINS PHASE I: Performed by: SURGERY

## 2024-12-10 PROCEDURE — 96375 TX/PRO/DX INJ NEW DRUG ADDON: CPT

## 2024-12-10 PROCEDURE — 700111 HCHG RX REV CODE 636 W/ 250 OVERRIDE (IP): Performed by: ANESTHESIOLOGY

## 2024-12-10 PROCEDURE — 700102 HCHG RX REV CODE 250 W/ 637 OVERRIDE(OP): Performed by: OBSTETRICS & GYNECOLOGY

## 2024-12-10 PROCEDURE — 96374 THER/PROPH/DIAG INJ IV PUSH: CPT

## 2024-12-10 PROCEDURE — 160009 HCHG ANES TIME/MIN: Performed by: SURGERY

## 2024-12-10 PROCEDURE — 99291 CRITICAL CARE FIRST HOUR: CPT

## 2024-12-10 PROCEDURE — 700111 HCHG RX REV CODE 636 W/ 250 OVERRIDE (IP): Mod: JZ | Performed by: STUDENT IN AN ORGANIZED HEALTH CARE EDUCATION/TRAINING PROGRAM

## 2024-12-10 PROCEDURE — 700101 HCHG RX REV CODE 250: Performed by: SURGERY

## 2024-12-10 PROCEDURE — 85025 COMPLETE CBC W/AUTO DIFF WBC: CPT

## 2024-12-10 PROCEDURE — 36415 COLL VENOUS BLD VENIPUNCTURE: CPT

## 2024-12-10 PROCEDURE — 700101 HCHG RX REV CODE 250: Performed by: ANESTHESIOLOGY

## 2024-12-10 PROCEDURE — 700111 HCHG RX REV CODE 636 W/ 250 OVERRIDE (IP): Mod: JZ | Performed by: OBSTETRICS & GYNECOLOGY

## 2024-12-10 PROCEDURE — A9270 NON-COVERED ITEM OR SERVICE: HCPCS | Performed by: OBSTETRICS & GYNECOLOGY

## 2024-12-10 PROCEDURE — 96376 TX/PRO/DX INJ SAME DRUG ADON: CPT

## 2024-12-10 PROCEDURE — 700105 HCHG RX REV CODE 258: Performed by: OBSTETRICS & GYNECOLOGY

## 2024-12-10 PROCEDURE — 83690 ASSAY OF LIPASE: CPT

## 2024-12-10 PROCEDURE — 76705 ECHO EXAM OF ABDOMEN: CPT

## 2024-12-10 RX ORDER — CEFTRIAXONE 2 G/1
2000 INJECTION, POWDER, FOR SOLUTION INTRAMUSCULAR; INTRAVENOUS ONCE
Status: COMPLETED | OUTPATIENT
Start: 2024-12-10 | End: 2024-12-10

## 2024-12-10 RX ORDER — DIPHENHYDRAMINE HYDROCHLORIDE 50 MG/ML
12.5 INJECTION INTRAMUSCULAR; INTRAVENOUS
Status: DISCONTINUED | OUTPATIENT
Start: 2024-12-10 | End: 2024-12-10 | Stop reason: HOSPADM

## 2024-12-10 RX ORDER — ONDANSETRON 2 MG/ML
4 INJECTION INTRAMUSCULAR; INTRAVENOUS
Status: DISCONTINUED | OUTPATIENT
Start: 2024-12-10 | End: 2024-12-10 | Stop reason: HOSPADM

## 2024-12-10 RX ORDER — HALOPERIDOL 5 MG/ML
1 INJECTION INTRAMUSCULAR
Status: DISCONTINUED | OUTPATIENT
Start: 2024-12-10 | End: 2024-12-10 | Stop reason: HOSPADM

## 2024-12-10 RX ORDER — ONDANSETRON 2 MG/ML
4 INJECTION INTRAMUSCULAR; INTRAVENOUS EVERY 4 HOURS PRN
Status: DISCONTINUED | OUTPATIENT
Start: 2024-12-10 | End: 2024-12-10 | Stop reason: HOSPADM

## 2024-12-10 RX ORDER — HYDROMORPHONE HYDROCHLORIDE 1 MG/ML
0.4 INJECTION, SOLUTION INTRAMUSCULAR; INTRAVENOUS; SUBCUTANEOUS
Status: DISCONTINUED | OUTPATIENT
Start: 2024-12-10 | End: 2024-12-10 | Stop reason: HOSPADM

## 2024-12-10 RX ORDER — DEXAMETHASONE SODIUM PHOSPHATE 4 MG/ML
INJECTION, SOLUTION INTRA-ARTICULAR; INTRALESIONAL; INTRAMUSCULAR; INTRAVENOUS; SOFT TISSUE PRN
Status: DISCONTINUED | OUTPATIENT
Start: 2024-12-10 | End: 2024-12-10 | Stop reason: SURG

## 2024-12-10 RX ORDER — ACETAMINOPHEN 500 MG
1000 TABLET ORAL EVERY 6 HOURS PRN
Status: DISCONTINUED | OUTPATIENT
Start: 2024-12-15 | End: 2024-12-10 | Stop reason: HOSPADM

## 2024-12-10 RX ORDER — KETOROLAC TROMETHAMINE 15 MG/ML
INJECTION, SOLUTION INTRAMUSCULAR; INTRAVENOUS PRN
Status: DISCONTINUED | OUTPATIENT
Start: 2024-12-10 | End: 2024-12-10 | Stop reason: SURG

## 2024-12-10 RX ORDER — ACETAMINOPHEN 500 MG
1000 TABLET ORAL EVERY 6 HOURS PRN
COMMUNITY
Start: 2024-12-10 | End: 2024-12-17

## 2024-12-10 RX ORDER — BUPIVACAINE HYDROCHLORIDE AND EPINEPHRINE 5; 5 MG/ML; UG/ML
INJECTION, SOLUTION EPIDURAL; INTRACAUDAL; PERINEURAL
Status: DISCONTINUED | OUTPATIENT
Start: 2024-12-10 | End: 2024-12-10 | Stop reason: HOSPADM

## 2024-12-10 RX ORDER — AMOXICILLIN 250 MG
1 CAPSULE ORAL NIGHTLY
Status: DISCONTINUED | OUTPATIENT
Start: 2024-12-10 | End: 2024-12-10 | Stop reason: HOSPADM

## 2024-12-10 RX ORDER — SODIUM PHOSPHATE,MONO-DIBASIC 19G-7G/118
1 ENEMA (ML) RECTAL
Status: DISCONTINUED | OUTPATIENT
Start: 2024-12-10 | End: 2024-12-10 | Stop reason: HOSPADM

## 2024-12-10 RX ORDER — ROCURONIUM BROMIDE 10 MG/ML
INJECTION, SOLUTION INTRAVENOUS PRN
Status: DISCONTINUED | OUTPATIENT
Start: 2024-12-10 | End: 2024-12-10 | Stop reason: SURG

## 2024-12-10 RX ORDER — MORPHINE SULFATE 4 MG/ML
4 INJECTION INTRAVENOUS ONCE
Status: COMPLETED | OUTPATIENT
Start: 2024-12-10 | End: 2024-12-10

## 2024-12-10 RX ORDER — METRONIDAZOLE 500 MG/100ML
500 INJECTION, SOLUTION INTRAVENOUS ONCE
Status: COMPLETED | OUTPATIENT
Start: 2024-12-10 | End: 2024-12-10

## 2024-12-10 RX ORDER — ALBUTEROL SULFATE 5 MG/ML
2.5 SOLUTION RESPIRATORY (INHALATION)
Status: DISCONTINUED | OUTPATIENT
Start: 2024-12-10 | End: 2024-12-10 | Stop reason: HOSPADM

## 2024-12-10 RX ORDER — SCOLOPAMINE TRANSDERMAL SYSTEM 1 MG/1
PATCH, EXTENDED RELEASE TRANSDERMAL
Status: COMPLETED
Start: 2024-12-10 | End: 2024-12-10

## 2024-12-10 RX ORDER — ONDANSETRON 2 MG/ML
INJECTION INTRAMUSCULAR; INTRAVENOUS PRN
Status: DISCONTINUED | OUTPATIENT
Start: 2024-12-10 | End: 2024-12-10 | Stop reason: SURG

## 2024-12-10 RX ORDER — MIDAZOLAM HYDROCHLORIDE 1 MG/ML
INJECTION INTRAMUSCULAR; INTRAVENOUS PRN
Status: DISCONTINUED | OUTPATIENT
Start: 2024-12-10 | End: 2024-12-10 | Stop reason: SURG

## 2024-12-10 RX ORDER — HYDROMORPHONE HYDROCHLORIDE 2 MG/ML
INJECTION, SOLUTION INTRAMUSCULAR; INTRAVENOUS; SUBCUTANEOUS PRN
Status: DISCONTINUED | OUTPATIENT
Start: 2024-12-10 | End: 2024-12-10 | Stop reason: SURG

## 2024-12-10 RX ORDER — ONDANSETRON 4 MG/1
4 TABLET, ORALLY DISINTEGRATING ORAL EVERY 4 HOURS PRN
Status: DISCONTINUED | OUTPATIENT
Start: 2024-12-10 | End: 2024-12-10 | Stop reason: HOSPADM

## 2024-12-10 RX ORDER — DEXMEDETOMIDINE HYDROCHLORIDE 100 UG/ML
INJECTION, SOLUTION INTRAVENOUS PRN
Status: DISCONTINUED | OUTPATIENT
Start: 2024-12-10 | End: 2024-12-10 | Stop reason: SURG

## 2024-12-10 RX ORDER — SODIUM CHLORIDE, SODIUM LACTATE, POTASSIUM CHLORIDE, AND CALCIUM CHLORIDE .6; .31; .03; .02 G/100ML; G/100ML; G/100ML; G/100ML
1000 INJECTION, SOLUTION INTRAVENOUS ONCE
Status: COMPLETED | OUTPATIENT
Start: 2024-12-10 | End: 2024-12-10

## 2024-12-10 RX ORDER — ONDANSETRON 2 MG/ML
4 INJECTION INTRAMUSCULAR; INTRAVENOUS ONCE
Status: COMPLETED | OUTPATIENT
Start: 2024-12-10 | End: 2024-12-10

## 2024-12-10 RX ORDER — OXYCODONE HCL 5 MG/5 ML
10 SOLUTION, ORAL ORAL
Status: COMPLETED | OUTPATIENT
Start: 2024-12-10 | End: 2024-12-10

## 2024-12-10 RX ORDER — OXYCODONE HCL 5 MG/5 ML
5 SOLUTION, ORAL ORAL
Status: COMPLETED | OUTPATIENT
Start: 2024-12-10 | End: 2024-12-10

## 2024-12-10 RX ORDER — SODIUM CHLORIDE, SODIUM LACTATE, POTASSIUM CHLORIDE, CALCIUM CHLORIDE 600; 310; 30; 20 MG/100ML; MG/100ML; MG/100ML; MG/100ML
INJECTION, SOLUTION INTRAVENOUS
Status: DISCONTINUED | OUTPATIENT
Start: 2024-12-10 | End: 2024-12-10 | Stop reason: SURG

## 2024-12-10 RX ORDER — BISACODYL 10 MG
10 SUPPOSITORY, RECTAL RECTAL
Status: DISCONTINUED | OUTPATIENT
Start: 2024-12-10 | End: 2024-12-10 | Stop reason: HOSPADM

## 2024-12-10 RX ORDER — SCOLOPAMINE TRANSDERMAL SYSTEM 1 MG/1
PATCH, EXTENDED RELEASE TRANSDERMAL PRN
Status: DISCONTINUED | OUTPATIENT
Start: 2024-12-10 | End: 2024-12-10 | Stop reason: SURG

## 2024-12-10 RX ORDER — HYDROMORPHONE HYDROCHLORIDE 1 MG/ML
0.2 INJECTION, SOLUTION INTRAMUSCULAR; INTRAVENOUS; SUBCUTANEOUS
Status: DISCONTINUED | OUTPATIENT
Start: 2024-12-10 | End: 2024-12-10 | Stop reason: HOSPADM

## 2024-12-10 RX ORDER — SODIUM CHLORIDE, SODIUM LACTATE, POTASSIUM CHLORIDE, CALCIUM CHLORIDE 600; 310; 30; 20 MG/100ML; MG/100ML; MG/100ML; MG/100ML
INJECTION, SOLUTION INTRAVENOUS CONTINUOUS
Status: DISCONTINUED | OUTPATIENT
Start: 2024-12-10 | End: 2024-12-10 | Stop reason: HOSPADM

## 2024-12-10 RX ORDER — HYDROMORPHONE HYDROCHLORIDE 1 MG/ML
0.5 INJECTION, SOLUTION INTRAMUSCULAR; INTRAVENOUS; SUBCUTANEOUS
Status: DISCONTINUED | OUTPATIENT
Start: 2024-12-10 | End: 2024-12-10 | Stop reason: HOSPADM

## 2024-12-10 RX ORDER — POLYETHYLENE GLYCOL 3350 17 G/17G
1 POWDER, FOR SOLUTION ORAL 2 TIMES DAILY
Status: DISCONTINUED | OUTPATIENT
Start: 2024-12-10 | End: 2024-12-10 | Stop reason: HOSPADM

## 2024-12-10 RX ORDER — METHOCARBAMOL 100 MG/ML
1000 INJECTION, SOLUTION INTRAMUSCULAR; INTRAVENOUS
Status: COMPLETED | OUTPATIENT
Start: 2024-12-10 | End: 2024-12-10

## 2024-12-10 RX ORDER — CEFAZOLIN SODIUM 1 G/3ML
INJECTION, POWDER, FOR SOLUTION INTRAMUSCULAR; INTRAVENOUS PRN
Status: DISCONTINUED | OUTPATIENT
Start: 2024-12-10 | End: 2024-12-10 | Stop reason: SURG

## 2024-12-10 RX ORDER — AMOXICILLIN 250 MG
1 CAPSULE ORAL
Status: DISCONTINUED | OUTPATIENT
Start: 2024-12-10 | End: 2024-12-10 | Stop reason: HOSPADM

## 2024-12-10 RX ORDER — HYDRALAZINE HYDROCHLORIDE 20 MG/ML
5 INJECTION INTRAMUSCULAR; INTRAVENOUS
Status: DISCONTINUED | OUTPATIENT
Start: 2024-12-10 | End: 2024-12-10 | Stop reason: HOSPADM

## 2024-12-10 RX ORDER — DOCUSATE SODIUM 100 MG/1
100 CAPSULE, LIQUID FILLED ORAL 2 TIMES DAILY
Status: DISCONTINUED | OUTPATIENT
Start: 2024-12-10 | End: 2024-12-10 | Stop reason: HOSPADM

## 2024-12-10 RX ORDER — ACETAMINOPHEN 500 MG
1000 TABLET ORAL EVERY 6 HOURS
Status: DISCONTINUED | OUTPATIENT
Start: 2024-12-10 | End: 2024-12-10 | Stop reason: HOSPADM

## 2024-12-10 RX ADMIN — SCOPOLAMINE 1 PATCH: 1.5 PATCH, EXTENDED RELEASE TRANSDERMAL at 07:32

## 2024-12-10 RX ADMIN — METHOCARBAMOL 1000 MG: 100 INJECTION INTRAMUSCULAR; INTRAVENOUS at 09:32

## 2024-12-10 RX ADMIN — OXYCODONE HYDROCHLORIDE 10 MG: 5 SOLUTION ORAL at 09:00

## 2024-12-10 RX ADMIN — DEXAMETHASONE SODIUM PHOSPHATE 8 MG: 4 INJECTION INTRA-ARTICULAR; INTRALESIONAL; INTRAMUSCULAR; INTRAVENOUS; SOFT TISSUE at 08:05

## 2024-12-10 RX ADMIN — ROCURONIUM BROMIDE 70 MG: 50 INJECTION, SOLUTION INTRAVENOUS at 08:03

## 2024-12-10 RX ADMIN — FENTANYL CITRATE 50 MCG: 50 INJECTION, SOLUTION INTRAMUSCULAR; INTRAVENOUS at 09:44

## 2024-12-10 RX ADMIN — HYDROMORPHONE HYDROCHLORIDE 0.5 MG: 2 INJECTION INTRAMUSCULAR; INTRAVENOUS; SUBCUTANEOUS at 08:15

## 2024-12-10 RX ADMIN — FENTANYL CITRATE 50 MCG: 50 INJECTION, SOLUTION INTRAMUSCULAR; INTRAVENOUS at 00:40

## 2024-12-10 RX ADMIN — KETOROLAC TROMETHAMINE 15 MG: 15 INJECTION, SOLUTION INTRAMUSCULAR; INTRAVENOUS at 08:37

## 2024-12-10 RX ADMIN — CEFTRIAXONE SODIUM 2000 MG: 2 INJECTION, POWDER, FOR SOLUTION INTRAMUSCULAR; INTRAVENOUS at 05:08

## 2024-12-10 RX ADMIN — FENTANYL CITRATE 50 MCG: 50 INJECTION, SOLUTION INTRAMUSCULAR; INTRAVENOUS at 09:15

## 2024-12-10 RX ADMIN — DEXMEDETOMIDINE HYDROCHLORIDE 30 MCG: 100 INJECTION, SOLUTION INTRAVENOUS at 08:19

## 2024-12-10 RX ADMIN — SODIUM CHLORIDE, POTASSIUM CHLORIDE, SODIUM LACTATE AND CALCIUM CHLORIDE 1000 ML: 600; 310; 30; 20 INJECTION, SOLUTION INTRAVENOUS at 00:41

## 2024-12-10 RX ADMIN — ONDANSETRON 4 MG: 2 INJECTION INTRAMUSCULAR; INTRAVENOUS at 05:03

## 2024-12-10 RX ADMIN — MIDAZOLAM HYDROCHLORIDE 2 MG: 1 INJECTION, SOLUTION INTRAMUSCULAR; INTRAVENOUS at 07:58

## 2024-12-10 RX ADMIN — LIDOCAINE HYDROCHLORIDE 30 ML: 20 SOLUTION ORAL; TOPICAL at 02:47

## 2024-12-10 RX ADMIN — FENTANYL CITRATE 50 MCG: 50 INJECTION, SOLUTION INTRAMUSCULAR; INTRAVENOUS at 03:20

## 2024-12-10 RX ADMIN — ONDANSETRON 4 MG: 2 INJECTION INTRAMUSCULAR; INTRAVENOUS at 00:41

## 2024-12-10 RX ADMIN — MORPHINE SULFATE 4 MG: 4 INJECTION, SOLUTION INTRAMUSCULAR; INTRAVENOUS at 05:05

## 2024-12-10 RX ADMIN — METRONIDAZOLE 500 MG: 500 INJECTION, SOLUTION INTRAVENOUS at 05:21

## 2024-12-10 RX ADMIN — SUGAMMADEX 200 MG: 100 INJECTION, SOLUTION INTRAVENOUS at 08:37

## 2024-12-10 RX ADMIN — SODIUM CHLORIDE, POTASSIUM CHLORIDE, SODIUM LACTATE AND CALCIUM CHLORIDE: 600; 310; 30; 20 INJECTION, SOLUTION INTRAVENOUS at 07:58

## 2024-12-10 RX ADMIN — HYDROMORPHONE HYDROCHLORIDE 0.5 MG: 2 INJECTION INTRAMUSCULAR; INTRAVENOUS; SUBCUTANEOUS at 08:03

## 2024-12-10 RX ADMIN — ONDANSETRON 4 MG: 2 INJECTION INTRAMUSCULAR; INTRAVENOUS at 08:37

## 2024-12-10 RX ADMIN — CEFAZOLIN 2 G: 1 INJECTION, POWDER, FOR SOLUTION INTRAMUSCULAR; INTRAVENOUS at 07:58

## 2024-12-10 ASSESSMENT — PAIN SCALES - GENERAL: PAIN_LEVEL: 5

## 2024-12-10 ASSESSMENT — PAIN DESCRIPTION - PAIN TYPE
TYPE: ACUTE PAIN
TYPE: SURGICAL PAIN
TYPE: ACUTE PAIN

## 2024-12-10 ASSESSMENT — FIBROSIS 4 INDEX: FIB4 SCORE: 0.29

## 2024-12-10 NOTE — H&P
DATE OF CONSULTATION:  12/10/2024     REFERRING PHYSICIAN:   Agustin Niño M.D.     CONSULTING PHYSICIAN:  Willy Tomlin M.D.     REASON FOR CONSULTATION:  I have been asked by Dr. Niño to see the patient in surgical consultation for evaluation of abdominal pain.    HISTORY OF PRESENT ILLNESS: The patient is a 29 year-old White young woman who presents to the Emergency Department with a several-hours history of moderate epigastric and right upper quadrant  abdominal pain. The pain is associated with nausea and vomiting.  The patient is recently postpartum following a  section and is breast-feeding.  She had no episodes of similar pain during her pregnancy. The patient has undergone 2 c-sections, most recently a week ago on 2024, and a laparoscopic appendectomy as a child.     The patient was initially monitored by the L&D service, and was found to have cholelithiasis with gallbladder wall thickening, but her pain resolved with IV fentanyl.  However, her pain returned and she was sent to the emergency department for further evaluation due to intractable pain.  General surgery was consulted for evaluation for cholecystectomy.    PAST MEDICAL HISTORY:  has a past medical history of Acute nasopharyngitis (24-  24), ADD (attention deficit disorder), Allergy, Anxiety, Depression, GERD (gastroesophageal reflux disease), Nexplanon in place (2020), and Pregnant.    PAST SURGICAL HISTORY:  has a past surgical history that includes appendectomy (); primary c section (); and repeat c section (N/A, 2024).    ALLERGIES: No Known Allergies    CURRENT MEDICATIONS:    Home Medications       Reviewed by Margie Travis R.N. (Registered Nurse) on 12/10/24 at 0430  Med List Status: Not Addressed     Medication Last Dose Status   ibuprofen (MOTRIN) 800 MG Tab  Active   Prenatal MV-Min-Fe Fum-FA-DHA (PRENATAL 1 PO)  Active                    FAMILY HISTORY: family history includes  Alcohol abuse in her maternal grandmother; Breast Cancer in her paternal grandmother; Cancer in her paternal grandfather; Cancer (age of onset: 70) in her paternal grandmother; Heart Attack in her maternal grandfather and paternal grandfather; Hypertension in her mother; Psychiatric Illness in her maternal grandmother and sister; Stroke in her paternal grandfather.    SOCIAL HISTORY:  reports that she has never smoked. She has never used smokeless tobacco. She reports that she does not drink alcohol and does not use drugs.    REVIEW OF SYSTEMS: Comprehensive review of systems is negative with the exception of the aforementioned HPI, PMH, and PSH bullets in accordance with CMS guidelines.    PHYSICAL EXAMINATION:    CONSTITUTIONAL: Alert, awake, oriented x3.  HEENT: Normocephalic, atraumatic. PERRL. Conjunctivae normal.  NECK: Supple  CARDIOVASCULAR: Normal rate, regular rhythm.  PULMONARY/CHEST: No respiratory distress. Chest wall stable, non-tender, normal excursion.  ABDOMEN: Soft, non-distended, tender to palpation in the right upper quadrant, epigastric region.  Dressing in place over a transverse lower abdominal  section incision.  MUSCULOSKELETAL: Moving all extremities.  NEUROLOGICAL: CNII-XII grossly intact, no focal deficits.  SKIN: Warm and dry. No abrasions, lacerations.  PSYCHIATRIC: Behavior appropriate for situation.    LABORATORY VALUES:   Recent Labs     12/10/24  0030   WBC 11.3*   RBC 5.28   HEMOGLOBIN 13.1   HEMATOCRIT 42.3   MCV 80.1*   MCH 24.8*   MCHC 31.0*   RDW 40.2   PLATELETCT 382   MPV 9.0     Recent Labs     12/10/24  0030   SODIUM 138   POTASSIUM 3.5*   CHLORIDE 105   CO2 20   GLUCOSE 106*   BUN 8   CREATININE 0.40*   CALCIUM 9.0     Recent Labs     12/10/24  0030   ASTSGOT 17   ALTSGPT 22   TBILIRUBIN 0.3   ALKPHOSPHAT 97   GLOBULIN 3.1            IMAGING:   No orders to display       ASSESSMENT AND PLAN:     This is a 29-year-old female presenting with acute cholecystitis  following recent  section on 2024.    * Acute cholecystitis- (present on admission)  Assessment & Plan  Intractable pain, findings consistent with acute cholecystitis requiring cholecystectomy  IV antibiotics  N.p.o.  OR this morning for a laparoscopic cholecystectomy, possible open  Risks, benefits, and alternatives have been discussed with the patient  The patient is breast-feeding and breastmilk is her baby's primary source of nutrition at this time.  Please attempt to avoid medications which would be contraindicated for lactating mothers.        DISPOSITION: Admit [facility].     ____________________________________     Willy Tomlin M.D.    DD: 12/10/2024  5:48 AM    AAST Grading System for EGS Conditions  ACS NSQIP Surgical Risk Calculator

## 2024-12-10 NOTE — OR NURSING
Awake, alert and tolerating PO fluids.  Medicated for pain.  Vitals stable. 3/4 abdominal incisions clean, dry and well approximated with dermabond.  4th incision with moderate serosanguineous drainage. On monitors with alarms audible.    Called family member with update and approximate DC timeline.

## 2024-12-10 NOTE — ED PROVIDER NOTES
ED Provider Note    CHIEF COMPLAINT  Chief Complaint   Patient presents with    Epigastric Pain    Back Pain       EXTERNAL RECORDS REVIEWED  Outpatient labs & studies CBC and CMP performed this morning by the labor and delivery service reviewed.  Mild leukocytosis.  No cholestasis or elevation in transaminases.  No renal dysfunction.  Urinalysis without evidence of infection.  Ultrasound with cholelithiasis, gallbladder sludge and gallbladder wall thickening compatible with cholecystitis     HPI/ROS  LIMITATION TO HISTORY   Select: : None      Kathie Urban is a 29 y.o. female who presents to the emergency department for evaluation of epigastric right upper quadrant pain radiating to her back.  She reports that the pain actually started with back pain after eating dinner last night and then migrated to her right upper quadrant and epigastric area.  Pain has been constant but waxing and waning since onset.  She has never had similar pain before.  She reports pain has been quite severe.  She was seen by the L&D service and had a workup performed concerning for gallbladder disease.  She states initially she was feeling better after multiple rounds of fentanyl but then the pain returned and she was sent to the ER for further evaluation.  She reports 8 out of 10 pain in her right upper quadrant and back currently.  She reports nausea and vomiting as well.  She denies fevers or chills, vaginal bleeding or discharge.  She reports some pain with urination a few days ago but it has subsequently resolved.    PAST MEDICAL HISTORY   has a past medical history of Acute nasopharyngitis (11/9/24-  11/16/24), ADD (attention deficit disorder), Allergy, Anxiety, Depression, GERD (gastroesophageal reflux disease), Nexplanon in place (07/01/2020), and Pregnant.    SURGICAL HISTORY   has a past surgical history that includes appendectomy (2005); primary c section (2021); and repeat c section (N/A, 12/2/2024).    FAMILY  "HISTORY  Family History   Problem Relation Age of Onset    Hypertension Mother     Psychiatric Illness Sister     Psychiatric Illness Maternal Grandmother     Alcohol abuse Maternal Grandmother     Heart Attack Maternal Grandfather     Cancer Paternal Grandmother 70        breast    Breast Cancer Paternal Grandmother     Cancer Paternal Grandfather         skin    Heart Attack Paternal Grandfather     Stroke Paternal Grandfather     Hyperlipidemia Neg Hx     Diabetes Neg Hx     Heart Disease Neg Hx        SOCIAL HISTORY  Social History     Tobacco Use    Smoking status: Never    Smokeless tobacco: Never    Tobacco comments:     None   Vaping Use    Vaping status: Never Used   Substance and Sexual Activity    Alcohol use: Never     Alcohol/week: 0.6 oz     Types: 1 Glasses of wine per week    Drug use: Never    Sexual activity: Yes     Partners: Male     Birth control/protection: Implant       CURRENT MEDICATIONS  Home Medications       Reviewed by Margie Travis R.N. (Registered Nurse) on 12/10/24 at 0430  Med List Status: Not Addressed     Medication Last Dose Status   ibuprofen (MOTRIN) 800 MG Tab  Active   Prenatal MV-Min-Fe Fum-FA-DHA (PRENATAL 1 PO)  Active                    ALLERGIES  No Known Allergies    PHYSICAL EXAM  VITAL SIGNS: /72   Pulse 86   Temp 36.4 °C (97.6 °F) (Temporal)   Resp 18   Ht 1.702 m (5' 7\")   Wt 99.8 kg (220 lb)   SpO2 97%   Breastfeeding Yes   BMI 34.46 kg/m²    Constitutional: No acute distress, pleasant, somewhat uncomfortable appearing.  HEENT: Atraumatic, normocephalic, pupils are equal round reactive to light, nose normal, mouth shows moist mucous membranes  Cardiovascular: Regular rate and rhythm  Thorax & Lungs: No respiratory distress, no wheezes, rales or rhonchi, no chest wall tenderness.  GI: Soft, non-distended, right upper quadrant tenderness with guarding, positive Alonso sign, no CVA tenderness.  Skin: Warm, dry, no acute rash or lesion  Musculoskeletal: " Moving all extremities, no acute deformity, no edema, no tenderness  Neurologic: A&Ox3, at baseline mentation  Psychiatric: Appropriate affect for situation at this time          EKG/LABS  Labs Reviewed   LIPASE     Reviewed laboratory testing from L&D obtained this morning which demonstrates mild leukocytosis with a white blood count of 11.  CMP largely unremarkable    RADIOLOGY/PROCEDURES   I have independently interpreted the diagnostic imaging associated with this visit and am waiting the final reading from the radiologist.   My preliminary interpretation is as follows: Reviewed ultrasound which demonstrates cholelithiasis and gallbladder wall thickening        COURSE & MEDICAL DECISION MAKING    ASSESSMENT, COURSE AND PLAN  Care Narrative:     Patient presents to the emergency department as a transfer from the labor and delivery service for ongoing right upper quadrant abdominal pain and back pain acute onset postprandial last night.  Had a workup consistent with acute cholecystitis.  Labs actually quite reassuring but ultrasound with cholelithiasis and gallbladder wall thickening.  Case was initially discussed by the managing obstetrician with general surgeon Dr. Tomlin but plan at that time was for outpatient follow-up given resolution of her pain.  Pain currently 8 out of 10 severity and patient has a positive Alonso sign on exam.  Will medicate with IV morphine and given concern for acute cholecystitis will provide IV antibiotics as well.  Case again discussed with Dr. Tomlin who will come assess the patient.  Lipase added on to screen for gallstone pancreatitis though I feel this is less likely given absence of epigastric tenderness.  I feel symptoms are unlikely to reflect acute aortic pathology, bowel perforation, peritonitis, endometritis, UTI.    Dr. Tomlin assessed the patient and plans to admit her for ongoing care.      ADDITIONAL PROBLEMS MANAGED  None    DISPOSITION AND DISCUSSIONS  I have  discussed management of the patient with the following physicians and VENANCIO's: Dr. Tomlin, general surgeon    Decision tools and prescription drugs considered including, but not limited to: Antibiotics ceftriaxone, Flagyl .  Pain medication morphine    FINAL DIAGNOSIS  1. Acute cholecystitis    2. Right upper quadrant abdominal pain         Electronically signed by: Agustin Niño M.D., 12/10/2024 4:58 AM

## 2024-12-10 NOTE — CONSULTS
DATE OF SERVICE:  12/10/2024     HISTORY OF PRESENT ILLNESS:  The patient is a 29-year-old female,  4,   para 2, who presents to labor and delivery 1 week after a repeat    section.  The patient was doing well.  She was even seen in the office today   earlier as she thought she was having some urinary complaints with burning   when voiding and some frequency.  She was seen and evaluated in the office and   she reports that those symptoms completely resolved and there was no sign of   infection, but a culture was sent from the office.  She has had no fever.  She   has been only taking Motrin and Tylenol for pain and has not needed any of   the narcotics up until tonight.  Tonight, she had enchiladas for dinner.  She   had normal bowel movement today and then reports she had severe 10/10 upper   epigastric pain.  She began vomiting.  On arrival here, she was writhing,   10/10 pain and vomited in triage.  Her blood pressure was slightly elevated,   but she denies headache, blurry vision, or any other preeclampsia symptoms   other than the right upper quadrant.  Her pain was severe enough that she did   end up taking an oxycodone at home, which did not seem to touch her pain.  She   is breastfeeding and the baby is doing well.  She is here with her mother and   the baby is at home.     PAST MEDICAL HISTORY:  Otherwise, noncontributory.     PAST SURGICAL HISTORY:  Appendectomy and  x2.     GYNECOLOGIC HISTORY:  Regular menses prior to pregnancy.  No history of   abnormal Paps or STDs.     MEDICATIONS:  Include Motrin, Tylenol and oxycodone and prenatal vitamins.     ALLERGIES:  None.     SOCIAL HISTORY:  No alcohol, tobacco or history of drug use.     OBSTETRIC HISTORY:  .     PHYSICAL EXAMINATION:    VITAL SIGNS:  Latest blood pressure is normal, but her two initial blood   pressures were elevated at 143/80 and 150/82, she is afebrile, pulse is   70s-90s.  GENERAL:  I did see her after she  had already had IV fluids and fentanyl and   she was not writhing in the bed, but in discomfort.    LUNGS:  Breathing unlabored.  ABDOMEN:  Soft, nontender to light palpation.  Positive Alonso sign seen on   ultrasound.  No guarding.  Incision clean, dry and intact with postop bandage   in place still.  EXTREMITIES:  No cyanosis, clubbing or edema.  VAGINAL:  Deferred.     LABORATORY DATA:  Lab work, white blood cell count 11.3, hemoglobin 13.1,   hematocrit 42.3, platelet count 382,000.  Protein creatinine ratio was 144.    She had no protein on the dipstick, a small amount of leukocyte esterase and a   moderate amount of blood.  Again, urine culture was sent from the office   earlier today already.  Chemistry panel, sodium 138, potassium 3.5, chloride   105, BUN 8, creatinine 0.4, glucose 106.  AST is 17, ALT 22, total bilirubin   0.3.       Ultrasound, positive Alonso sign.  Gallbladder sludge and shadowing of mobile   gallstones are seen.  Gallbladder wall is thick and measures 3.9 mm, no   pericholecystic fluid.  Common bile duct 5 mm.  Portal vein is patent with   hepatic flow.  No ascites.     ASSESSMENT AND PLAN:  A 29-year-old female status post repeat  section   a week ago.  1.  Acute cholecystitis with cholelithiasis, normal bilirubin and normal liver   function testing.  Pain is improved with IV fentanyl and IV fluids.  We will   plan to give her a GI cocktail at this time.  If her pain is much improved, we   will plan to discharge her home and follow up outpatient with [facility]; however, if her pain persists or returns to severe degree, we   will plan to then get inpatient surgical consult.        ______________________________  MD YANI MALLOY/BALTA    DD:  12/10/2024 02:45  DT:  12/10/2024 03:57    Job#:  [id number]

## 2024-12-10 NOTE — ED NOTES
ERP at bedside    Called Lab to add Lipase onto blood samples already in lab,  states he will run it now.

## 2024-12-10 NOTE — OP REPORT
Surgeon: Ventura Canales MD   Assistant: MEME Zelaya  Preoperative diagnosis: Acute cholecystitis   Postop diagnosis: Acute cholecystitis   Procedure: Laparoscopic cholecystectomy   Anesthesia: General endotracheal anesthesia   Anesthesiologist: Sean Rowe MD  Indications: 29-year-old woman with evidence by history, physical, and imaging of acute cholecystitis.  A cholecystectomy is indicated.  Narrative: The procedure was discussed in detail with the patient including the laparoscopic approach, the potential for converting to an open procedure, and the associated risk of bleeding, infection, abscess, and hematoma.  I also discussed the risk of postoperative bile leak, common bile duct stricture, common bile duct transection, and the significance of these complications. The patient understood all of the above and wished to proceed. The patient was placed under anesthesia by Dr. Rowe.  The patient's abdomen was prepped with chlorhexidine prep and sterile drapes. After a time out an infraumbilical incision was made. Through this incision the peritoneal cavity was entered using the open Hason entry technique. pneumoperitoneum was achieved with co2 insufflation to a pressure of 15 mmhg mercury. under direct visualization a 12 millimeters subxiphoid and two 5mm subcostal ports were placed.  The gallbladder was identified.  It was tense, distended, and edematous consistent with acute cholecystitis.  It was drained with a percutaneous drainage needle.  It was then retracted superiorly and laterally. multiple adhesions were noted and these were carefully taken down.  The base of the gallbladder was carefully dissected.  The cystic duct was identified and could be seen to clearly exit the gallbladder and retract laterally along the gallbladder. In  a similar fashion the cystic artery was identified and dissected away from surrounding connective tissue.  A critical view was obtained. subsequent to this 3  clips were placed proximally on the duct and 1 distally and it was divided with scissors. Similarly, the cystic artery was clipped twice proximally and one distally and divided sharply with scissors. The gallbladder was then dissected off the liver bed and placed in a bag retrieval device. Hemostasis was assured with cautery. There was no evidence of bleeding or bile leak. Ports removed and the pneumoperitoneum was released. The infraumbilical fascia was approximated with an 0 Vicryl stitch. The subcutaneous tissue was irrigated and the skin on all incisions was approximatedwith 4.0 vicryl  stitches. Dermabond was placed. The patient tolerated procedure well without apparent complication. Final counts were reported as correct.  Wound class was class III.    The first assist, MEME Zelaya, was necessary in this case due to the complexity of the operation.  Argenis assisted with port placement, holding of retractors and managing the laparoscope.  She also assisted with port removal and incision closure.

## 2024-12-10 NOTE — ANESTHESIA PREPROCEDURE EVALUATION
Case: [id number] Date/Time: 12/10/24 0715    Procedure: CHOLECYSTECTOMY, LAPAROSCOPIC    Location: [facility] OR  / SURGERY [facility]    Surgeons: Ventura Canales M.D.          Recently pregnant ( delivery one week ago).  No other significant medical hx.        Physical Exam    Airway   Mallampati: II  TM distance: >3 FB  Neck ROM: full       Cardiovascular - normal exam  Rhythm: regular  Rate: normal  (-) murmur     Dental - normal exam           Pulmonary - normal exam  Breath sounds clear to auscultation     Abdominal    Neurological - normal exam                   Anesthesia Plan    ASA 1       Plan - general       Airway plan will be ETT          Induction: intravenous    Postoperative Plan: Postoperative administration of opioids is intended.    Pertinent diagnostic labs and testing reviewed    Informed Consent:    Anesthetic plan and risks discussed with patient.    Use of blood products discussed with: patient whom consented to blood products.

## 2024-12-10 NOTE — ED NOTES
Called Lab to check on status of Lipase that was added on to pt's blood samples 2 hours ago. Per  the results are showing up under a different CSN: [id number]

## 2024-12-10 NOTE — PROGRESS NOTES
30 y/o. . Pt had a repeat  section on 2024. Pt reports no known complications. Pt denies increase in postpartum bleeding, breastfeeding, afebrile.  Denies clots. Pt reports she ate dinner at approx 1830 this evening and at 1900 pain started in her back, specifically between her shoulder blades, pain wraps around to epigastric area. Pt vomited on arrival to L&D. Pt's mother at bedside for support.   Oriented to room and call light system.   0010: Dr Hodgson called report given. Orders received.   US at bedside.   0015: Labs sent.  0130: Dr Hodgson called, report given. Labs reviewed. Orders received.  0215: Dr Hodgson at bedside.   0250: GI cocktail given.   0345: Dr Hodgson updated with current status. Pt with 6/10 pain.  0400: Per Dr Hodgson she is in discussion with Dr Tomlin in the ER. Dr Hodgson would like the pt to be seen in ER for evaluation by surgery and ER doctor. Notified L&D charge RN who contacted ER charge. Cleared obstetrically.   Marquita, care aide, took pt down to ER charge. Pt taken to ER via wheelchair by care aide with all belongings. VSS. Pt's mother accompanying her.

## 2024-12-10 NOTE — ANESTHESIA PROCEDURE NOTES
Airway    Date/Time: 12/10/2024 8:04 AM    Performed by: Sean Rowe M.D.  Authorized by: Sean Rowe M.D.    Location:  OR  Urgency:  Elective  Difficult Airway: No    Indications for Airway Management:  Anesthesia      Spontaneous Ventilation: absent    Sedation Level:  Deep  Preoxygenated: Yes    Patient Position:  Sniffing  Mask Difficulty Assessment:  1 - vent by mask  Final Airway Type:  Endotracheal airway  Final Endotracheal Airway:  ETT  Cuffed: Yes    Technique Used for Successful ETT Placement:  Direct laryngoscopy    Insertion Site:  Oral  Blade Type:  Mauricio  Laryngoscope Blade/Videolaryngoscope Blade Size:  2  ETT Size (mm):  7.0  Measured from:  Lips  ETT to Lips (cm):  22  Placement Verified by: auscultation and capnometry    Cormack-Lehane Classification:  Grade I - full view of glottis  Number of Attempts at Approach:  1

## 2024-12-10 NOTE — PROGRESS NOTES
Seen in preop  Risks and benefits of a cholecystectomy discussed with the patient  Does wish to proceed

## 2024-12-10 NOTE — ED NOTES
Surgeon at bedside. Per pt her last meal was around 1830 and she had ice chips and was sipping water until around 0430.

## 2024-12-10 NOTE — ED TRIAGE NOTES
"  Chief Complaint   Patient presents with    Epigastric Pain    Back Pain     Pt presents to triage for sudden onset epigastric pain radiating her to back. Pt is 7 days post  with no complications or prolonged hospital stay. Pt also endorses N/V, denies vaginal bleeding, denies fevers at home. Pt was seen and cleared by L&D prior to ED visit.       Pt is alert/oriented, following commands, and answering questions appropriately. Pt placed in lobby and educated on triage process. Pt encouraged to alert staff for any changes in condition.    ./83   Pulse 95   Temp 36.4 °C (97.6 °F) (Temporal)   Resp 20   Ht 1.702 m (5' 7\")   Wt 99.8 kg (220 lb)   SpO2 95%   Breastfeeding Yes   BMI 34.46 kg/m²       "
no loss of consciousness, no gait abnormality, no headache, no sensory deficits, and no weakness.

## 2024-12-10 NOTE — ED NOTES
Report called to Estefania MURGUIA RN in Pre-Op. Will continue to monitor pt while awaiting Transport Tech arrival to take pt upstairs.

## 2024-12-10 NOTE — OR NURSING
Pt verbalizes readiness for discharge. Instructions reviewed with pt and pt's mother by Tamia PATRICK. No further needs. Pt taken to car via wheelchair by CNA.

## 2024-12-10 NOTE — ASSESSMENT & PLAN NOTE
Intractable pain, findings consistent with acute cholecystitis requiring cholecystectomy  IV antibiotics  N.p.o.  OR this morning for a laparoscopic cholecystectomy, possible open  Risks, benefits, and alternatives have been discussed with the patient  The patient is breast-feeding and breastmilk is her baby's primary source of nutrition at this time.  Please attempt to avoid medications which would be contraindicated for lactating mothers.

## 2024-12-10 NOTE — DISCHARGE INSTRUCTIONS
HOME CARE INSTRUCTIONS    ACTIVITY: Rest and take it easy for the first 24 hours.  A responsible adult is recommended to remain with you during that time.  It is normal to feel sleepy.  We encourage you to not do anything that requires balance, judgment or coordination.    FOR 24 HOURS DO NOT:  Drive, operate machinery or run household appliances.  Drink beer or alcoholic beverages.  Make important decisions or sign legal documents.           Post Operative Discharge Instructions:    1. DIET: Upon discharge from the hospital, you may resume your normal preoperative diet, unless specifically directed otherwise. Depending on how you are feeling and whether you have nausea or not, you may wish to stay with a bland diet for the first few days. However, you can advance this as quickly as you feel ready.    2. ACTIVITIES: After discharge from the hospital, you may resume full routine activities; however, there should be no heavy lifting (greater than 20 pounds or a bag of groceries) and no strenuous activities for at least 2 weeks. The duration may be longer, depending on your surgical procedure. Routine activities of daily living are acceptable. Activity level should be addressed at your post-op follow up appointment.    3. DRIVING: You may drive whenever you are off pain medications and are able to perform the activities needed to drive, i.e., turning, bending, twisting, etc.    4. BATHING: You may get the wound wet at any time after leaving the hospital. You may shower, but do not submerge in a bath for at least two weeks.  If you have wound dressings, they may come off after 48 hours.  If you have skin glue to the wound, this will fall off on its own, do not pick at it.  If you have Steri-Strips to the wound, these will fall off on their own, do not pick at them. You may trim the edges if needed.    5. BOWEL FUNCTION:   After surgery, it is not uncommon for patients to develop either frequent or loose stools after  meals. This usually corrects itself after a few days, to a few weeks. If this occurs, do not worry; this will resolve on its own.  Constipation is much more common than loose stools. The cause is the combination of pain medication and decreased activity level and possibly the nature of the surgical procedure performed. If you feel this is occurring, you may use an over-the-counter treatment such as MiraLAX (or Milk of Magnesia, Ex-Lax, Senokot, etc.) until the problem has resolved. Drink plenty of water and try to wean off narcotic pain medications as soon as is comfortable for you.    6. PAIN MEDICATION:   You will be given a prescription for pain medication at discharge. Please take these as directed. It is important to remember not to take medications on an empty stomach as this may cause nausea.  You may also take over the counter acetaminophen and/or NSAIDS (ibuprofen, Aleve, Advil, Motrin) per the package instructions.  You may also use ice to the wound to decrease pain and swelling. You may alternate 20 minutes on and 20 minutes off with the ice for the first 24-48 hours. Make sure you place a washcloth or towel between the ice pack and your skin.  Please note that narcotic pain medication cannot be refilled unless you are seen by a doctor. Make sure you call the office if you are running low on medication or if the dose you have been prescribed is not working well for you.    7.CALL THE [facility] AT [phone] IF YOU HAVE:  (1) Fevers to more than 101F, (2) Unusual chest or leg pain, (3) Drainage or fluid from incision that may be foul smelling, increased tenderness or soreness at the wound or the wound edges are no longer together, redness or swelling at the incision site. Do not hesitate to call with any other questions.     MEDICATIONS: Resume taking daily medication.  Take prescribed pain medication with food.  If no medication is prescribed, you may take non-aspirin pain medication if  needed.  PAIN MEDICATION CAN BE VERY CONSTIPATING.  Take a stool softener or laxative such as senokot, pericolace, or milk of magnesia if needed.    Take Tylenol over the counter for pain control. Last pain medication given at _9:00am Roxicodone; (Scopolamine patch - may remove in 3 days/72 hours - wash hands well after throwing patch away)    Pump and dump for four hours post medications - okay to nurse after 2:00pm.    A follow-up appointment should be arranged with your doctor; call to schedule.    You should CALL YOUR PHYSICIAN if you develop:  Fever greater than 101 degrees F.  Pain not relieved by medication, or persistent nausea or vomiting.  Excessive bleeding (blood soaking through dressing) or unexpected drainage from the wound.  Extreme redness or swelling around the incision site, drainage of pus or foul smelling drainage.  Inability to urinate or empty your bladder within 8 hours.  Problems with breathing or chest pain.    You should call 911 if you develop problems with breathing or chest pain.  If you are unable to contact your doctor or surgical center, you should go to the nearest emergency room or urgent care center.  Physician's telephone #: [phone]    MILD FLU-LIKE SYMPTOMS ARE NORMAL.  YOU MAY EXPERIENCE GENERALIZED MUSCLE ACHES, THROAT IRRITATION, HEADACHE AND/OR SOME NAUSEA.    If any questions arise, call your doctor.  If your doctor is not available, please feel free to call the Surgical Center at [phone].  The Center is open Monday through Friday from 7AM to 7PM.      A registered nurse may call you a few days after your surgery to see how you are doing after your procedure.    You may also receive a survey in the mail within the next two weeks and we ask that you take a few moments to complete the survey and return it to us.  Our goal is to provide you with very good care and we value your comments.     Depression / Suicide Risk    As you are discharged from this [facility]  facility, it is important to learn how to keep safe from harming yourself.    Recognize the warning signs:  Abrupt changes in personality, positive or negative- including increase in energy   Giving away possessions  Change in eating patterns- significant weight changes-  positive or negative  Change in sleeping patterns- unable to sleep or sleeping all the time   Unwillingness or inability to communicate  Depression  Unusual sadness, discouragement and loneliness  Talk of wanting to die  Neglect of personal appearance   Rebelliousness- reckless behavior  Withdrawal from people/activities they love  Confusion- inability to concentrate     If you or a loved one observes any of these behaviors or has concerns about self-harm, here's what you can do:  Talk about it- your feelings and reasons for harming yourself  Remove any means that you might use to hurt yourself (examples: pills, rope, extension cords, firearm)  Get professional help from the community (Mental Health, Substance Abuse, psychological counseling)  Do not be alone:Call your Safe Contact- someone whom you trust who will be there for you.  Call your local CRISIS HOTLINE 281-0173 or 134-655-4393  Call your local Children's Mobile Crisis Response Team [place] (992) 761-3853 or www.[employer]  Call the toll free National Suicide Prevention Hotlines   National Suicide Prevention Lifeline 348-539-CIII (0497)  National Hope Line Network 800-SUICIDE (858-3802)    I acknowledge receipt and understanding of these Home Care instructions.

## 2024-12-10 NOTE — ANESTHESIA TIME REPORT
Anesthesia Start and Stop Event Times       Date Time Event    12/10/2024 0655 Ready for Procedure     0758 Anesthesia Start     0856 Anesthesia Stop          Responsible Staff  12/10/24      Name Role Begin End    Sean Rowe M.D. Anesth 0758 0856          Overtime Reason:  no overtime (within assigned shift)    Comments:

## 2024-12-10 NOTE — ED NOTES
Pt being taken up to Pre-Op at this time by Transport Tech via gurney with mom. Pt is awake and alert, talking to staff, in no apparent distress at time of transfer. Pt's paperwork and belongings sent upstairs with pt, mom, and Transport Tech.

## 2024-12-10 NOTE — ANESTHESIA POSTPROCEDURE EVALUATION
Patient: Kathie Urban    Procedure Summary       Date: 12/10/24 Room / Location: [facility] 09 / SURGERY [facility]    Anesthesia Start: 0758 Anesthesia Stop: 0856    Procedure: CHOLECYSTECTOMY, LAPAROSCOPIC (Abdomen) Diagnosis: (ACUTE CHOLECYSTITIS)    Surgeons: Ventura Canales M.D. Responsible Provider: Sean Rowe M.D.    Anesthesia Type: general ASA Status: 1            Final Anesthesia Type: general  Last vitals  BP   Blood Pressure: 121/71    Temp   36.9 °C (98.5 °F)    Pulse   87   Resp   12    SpO2   92 %      Anesthesia Post Evaluation    Patient location during evaluation: PACU  Patient participation: complete - patient participated  Level of consciousness: awake and alert  Pain score: 5    Airway patency: patent  Anesthetic complications: no  Cardiovascular status: hemodynamically stable  Respiratory status: acceptable  Hydration status: euvolemic    PONV: none          No notable events documented.

## 2024-12-20 ENCOUNTER — OFFICE VISIT (OUTPATIENT)
Dept: SURGERY | Facility: MEDICAL CENTER | Age: 29
End: 2024-12-20
Attending: SURGERY
Payer: COMMERCIAL

## 2024-12-20 VITALS
OXYGEN SATURATION: 98 % | DIASTOLIC BLOOD PRESSURE: 78 MMHG | SYSTOLIC BLOOD PRESSURE: 120 MMHG | BODY MASS INDEX: 31.39 KG/M2 | WEIGHT: 200 LBS | HEIGHT: 67 IN

## 2024-12-20 DIAGNOSIS — Z90.49 S/P LAPAROSCOPIC CHOLECYSTECTOMY: ICD-10-CM

## 2024-12-20 PROCEDURE — 99024 POSTOP FOLLOW-UP VISIT: CPT | Performed by: NURSE PRACTITIONER

## 2024-12-20 ASSESSMENT — FIBROSIS 4 INDEX: FIB4 SCORE: 0.29

## 2024-12-20 NOTE — PROGRESS NOTES
"    HISTORY OF PRESENT ILLNESS: The patient is a 29 year-old woman who returns to the [facility] for routine follow-up after undergoing a laparoscopic cholecystectomy for acute cholecystitis by Ventura Canales MD on 12/10/2024. Since surgery, she is tolerating PO, denies fever, denies N/V.    CURRENT MEDICATIONS:  Current Outpatient Medications   Medication Sig    ibuprofen (MOTRIN) 800 MG Tab Take 1 Tablet by mouth every 8 hours for 14 days, THEN 1 Tablet every 8 hours as needed for Moderate Pain for up to 14 days.       PHYSICAL EXAMINATION:  Vital Signs: There were no vitals taken for this visit.  Physical Exam  Vitals and nursing note reviewed.   HENT:      Head: Atraumatic.   Cardiovascular:      Rate and Rhythm: Normal rate.   Abdominal:      Palpations: Abdomen is soft.      Comments: Port sites approximated with glue  Surgical C section incision.   No redness or drainage   Skin:     General: Skin is warm.   Neurological:      General: No focal deficit present.         LABORATORY VALUES:  Lab Results   Component Value Date/Time    WBC 11.3 (H) 12/10/2024 12:30 AM    RBC 5.28 12/10/2024 12:30 AM    HEMOGLOBIN 13.1 12/10/2024 12:30 AM    HEMATOCRIT 42.3 12/10/2024 12:30 AM    MCV 80.1 (L) 12/10/2024 12:30 AM    MCH 24.8 (L) 12/10/2024 12:30 AM    MCHC 31.0 (L) 12/10/2024 12:30 AM    MPV 9.0 12/10/2024 12:30 AM    NEUTSPOLYS 77.80 (H) 12/10/2024 12:30 AM    LYMPHOCYTES 14.80 (L) 12/10/2024 12:30 AM    MONOCYTES 3.70 12/10/2024 12:30 AM    EOSINOPHILS 2.80 12/10/2024 12:30 AM    BASOPHILS 0.40 12/10/2024 12:30 AM     Lab Results   Component Value Date/Time    SODIUM 138 12/10/2024 12:30 AM    POTASSIUM 3.5 (L) 12/10/2024 12:30 AM    CHLORIDE 105 12/10/2024 12:30 AM    CO2 20 12/10/2024 12:30 AM    GLUCOSE 106 (H) 12/10/2024 12:30 AM    BUN 8 12/10/2024 12:30 AM    CREATININE 0.40 (L) 12/10/2024 12:30 AM     No results found for: \"PROTHROMBTM\", \"INR\"    IMAGING:  No orders to display "       PATHOLOGY: Final pathology demonstrated acute and chronic cholecystitis with cholesterolosis and cholelithiasis.     ASSESSMENT AND PLAN:  Satisfactory progress following a laparoscopic cholecystectomy.  Discharge from the [facility] Follow-up Clinic.       ____________________________________     KARISHMA Sherman.    DD: 12/20/2024  11:02 AM

## 2024-12-27 NOTE — DISCHARGE SUMMARY
Discharge Summary:      Kathie Urban    Admit Date:   2024  Discharge Date:  2024     Admitting diagnosis:  Labor and delivery indication for care or intervention [O75.9]  Discharge Diagnosis: Status post  for repeat.  Pregnancy Complications: none  Tubal Ligation:  no        History:  Past Medical History:   Diagnosis Date    Acute nasopharyngitis 24-  24    ADD (attention deficit disorder)     Allergy     Anxiety     Depression     GERD (gastroesophageal reflux disease)     Nexplanon in place 2020    Placed 2018 by [facility] [employer]    Pregnant      OB History    Para Term  AB Living   3 1 1   2 1   SAB IAB Ectopic Molar Multiple Live Births     2     0 1      # Outcome Date GA Lbr Carlitos/2nd Weight Sex Type Anes PTL Lv   3 Term 24 37w0d  3.62 kg (7 lb 15.7 oz) M CS-LTranv Spinal N TIFFANY   2 IAB 2015           1 IAB                 Patient has no known allergies.  Patient Active Problem List    Diagnosis Date Noted    Acute cholecystitis 12/10/2024    Postoperative pain 2024    Labor and delivery indication for care or intervention 2024    Hemorrhoids 07/10/2023    Obesity (BMI 30-39.9) 07/10/2023    Deviated septum 2020    Seasonal allergies 2015        Hospital Course:   29 y.o. , now para 2, was admitted with the above mentioned diagnosis, underwent Active Labor,  for repeat. Patient postpartum course was unremarkable, with progressive advancement in diet , ambulation and toleration of oral analgesia. Patient without complaints today and desires discharge.      Vitals:    24 0210 24 0538 24 1800 24 0600   BP: 110/65 116/71 112/71 119/70   Pulse: 78 75 83 77   Resp: 18 16 18 18   Temp: 36.6 °C (97.9 °F) 36.2 °C (97.1 °F) 37.2 °C (99 °F) 36.3 °C (97.3 °F)   TempSrc: Temporal Temporal Temporal Temporal   SpO2: 96% 96% 94% 97%   Weight:       Height:           No current  facility-administered medications for this encounter.     Current Outpatient Medications   Medication    ibuprofen (MOTRIN) 800 MG Tab       Exam:  Breast Exam: negative  Abdomen: Abdomen soft, non-tender. BS normal. No masses,  No organomegaly  Fundus Non Tender: yes  Incision: dry and intact  Perineum: perineum intact  Extremity: extremities, peripheral pulses and reflexes normal     Labs:         Activity:   Discharge to home  Pelvic Rest x 6 weeks    Assessment:  normal postpartum course  Discharge Assessment: No heavy bleeding or foul vaginal discharge      Follow up: .Dr Garcia in 2w   Discharge Meds:   Current Outpatient Medications   Medication Sig Dispense Refill    ibuprofen (MOTRIN) 800 MG Tab Take 1 Tablet by mouth every 8 hours for 14 days, THEN 1 Tablet every 8 hours as needed for Moderate Pain for up to 14 days. 30 Tablet 0       Therese Ruby M.D.

## (undated) DEVICE — PACK LAP CHOLE OR - (2EA/CA)

## (undated) DEVICE — TRAY SPINAL ANESTHESIA NON-SAFETY (10/CA)

## (undated) DEVICE — DRESSING POST OP BORDER 4 X 10 (5EA/BX)

## (undated) DEVICE — SUTURE ABSORBABLE MONOFILAMENT VIOLET MONOCRYL CT-1 L36 IN (36EA/BX)

## (undated) DEVICE — CHLORAPREP 26 ML APPLICATOR - ORANGE TINT(25/CA)

## (undated) DEVICE — TROCAR 5X100 BLADED Z-THREAD - KII (6/BX)

## (undated) DEVICE — ELECTRODE DUAL RETURN W/ CORD - (50/PK)

## (undated) DEVICE — ADHESIVE DERMABOND HVD MINI (12EA/BX)

## (undated) DEVICE — GLOVE BIOGEL SZ 8 SURGICAL PF LTX - (50PR/BX 4BX/CA)

## (undated) DEVICE — TRAY FOLEY CATHETER STATLOCK 16FR SURESTEP (10EA/CA)

## (undated) DEVICE — SLEEVE, SEQUENTIAL CALF REG

## (undated) DEVICE — SUTURE GENERAL

## (undated) DEVICE — SODIUM CHL IRRIGATION 0.9% 1000ML (12EA/CA)

## (undated) DEVICE — HEAD HOLDER JUNIOR/ADULT

## (undated) DEVICE — SET LEADWIRE 5 LEAD BEDSIDE DISPOSABLE ECG (1SET OF 5/EA)

## (undated) DEVICE — SET SUCTION/IRRIGATION WITH DISPOSABLE TIP (6/CA )PART #0250-070-520 IS A SUB

## (undated) DEVICE — TUBING CLEARLINK DUO-VENT - C-FLO (48EA/CA)

## (undated) DEVICE — NEEDLE INSFL 120MM 14GA VRRS - (20/BX)

## (undated) DEVICE — SUTURE 0 VICRYL PLUS CT-1 - 36 INCH (36/BX)

## (undated) DEVICE — SLEEVE, VASO, THIGH, MED

## (undated) DEVICE — GLOVE BIOGEL SZ 7 SURGICAL PF LTX - (50PR/BX 4BX/CA)

## (undated) DEVICE — SUTURE 0 GUT-PLAIN (36PK/BX)

## (undated) DEVICE — SPONGE SURGICAL PVP IODINE L8 IN (20EA/CA)

## (undated) DEVICE — SUTURE 4-0 MONOCRYL PLUS PS-1 - 27 INCH (36/BX)

## (undated) DEVICE — STAPLER SKIN DISP - (6/BX 10BX/CA) VISISTAT

## (undated) DEVICE — BLANKET UNDERBODY FULL ACCES - (5/CA)

## (undated) DEVICE — SCISSORS 5MM CVD (6EA/BX)

## (undated) DEVICE — KIT SKIN NOSE AND MOUTH PRE-OP (20/CA)

## (undated) DEVICE — CANISTER SUCTION 3000ML MECHANICAL FILTER AUTO SHUTOFF MEDI-VAC NONSTERILE LF DISP (40EA/CA)

## (undated) DEVICE — GLOVE BIOGEL INDICATOR SZ 6 SURGICAL PF LTX -(50/BX)

## (undated) DEVICE — BLANKET STERILE CHICKIE FOR L&D (100/CA)

## (undated) DEVICE — SUTURE 0 VICRYL PLUS CT-2 - 27 INCH (36/BX)

## (undated) DEVICE — VESSEL DIVIDER SEAL LAP LIGASURE 37CM (6EA/BX)

## (undated) DEVICE — TROCAR Z THREAD12MM OPTICAL - NON BLADED (6/BX)

## (undated) DEVICE — PACK C-SECTION (2EA/CA)

## (undated) DEVICE — SENSOR OXIMETER ADULT SPO2 RD SET (20EA/BX)

## (undated) DEVICE — SUTURE 0 36IN PDS + VIO CT-1 (36PK/BX)

## (undated) DEVICE — GOWN WARMING STANDARD FLEX - (30/CA)

## (undated) DEVICE — SET EXTENSION WITH 2 PORTS (48EA/CA) ***PART #2C8610 IS A SUBSTITUTE*****

## (undated) DEVICE — TROCAR LAPSCP 100MM 12MM NTHRD - (6/BX)

## (undated) DEVICE — CLIP MED LG INTNL HRZN TI ESCP - (20/BX)

## (undated) DEVICE — SUTUREABS02-0 CT1 27IN - (36EA/BX)

## (undated) DEVICE — LACTATED RINGERS INJ 1000 ML - (14EA/CA 60CA/PF)

## (undated) DEVICE — PENCIL ELECTSURG 10FT HLSTR - WITH BLADE (50EA/CA)

## (undated) DEVICE — DERMABOND ADVANCED - (12EA/BX)

## (undated) DEVICE — KIT  I.V. START (100EA/CA)

## (undated) DEVICE — BAG RETRIEVAL 10ML (10EA/BX)

## (undated) DEVICE — COVER LIGHT HANDLE ALC PLUS DISP (18EA/BX)

## (undated) DEVICE — SOLUTION PLASMA-LYTE PH 7.4 INJ 1000ML (14EA/CA)

## (undated) DEVICE — CANNULA W/SEAL 5X100 Z-THRE - ADED KII (12/BX)

## (undated) DEVICE — GLOVE BIOGEL SZ 6 PF LATEX - (50EA/BX 4BX/CA)

## (undated) DEVICE — WATER IRRIGATION STERILE 1000ML (12EA/CA)

## (undated) DEVICE — MAT PATIENT POSITIONING PREVALON (10EA/CA)

## (undated) DEVICE — SUTURE 4-0 MONOCRYL PLUS PS-2 - 27 INCH (36/BX)

## (undated) DEVICE — TROCAR Z THREAD 12 X 100 - BLADED (6/BX)